# Patient Record
Sex: FEMALE | Race: BLACK OR AFRICAN AMERICAN | NOT HISPANIC OR LATINO | Employment: OTHER | URBAN - METROPOLITAN AREA
[De-identification: names, ages, dates, MRNs, and addresses within clinical notes are randomized per-mention and may not be internally consistent; named-entity substitution may affect disease eponyms.]

---

## 2024-11-25 ENCOUNTER — HOSPITAL ENCOUNTER (EMERGENCY)
Facility: HOSPITAL | Age: 89
Discharge: HOME | End: 2024-11-25
Attending: EMERGENCY MEDICINE
Payer: MEDICARE

## 2024-11-25 VITALS
DIASTOLIC BLOOD PRESSURE: 84 MMHG | SYSTOLIC BLOOD PRESSURE: 150 MMHG | TEMPERATURE: 98 F | HEART RATE: 82 BPM | RESPIRATION RATE: 18 BRPM | HEIGHT: 62 IN | BODY MASS INDEX: 17.66 KG/M2 | WEIGHT: 96 LBS | OXYGEN SATURATION: 98 %

## 2024-11-25 DIAGNOSIS — M25.559 HIP PAIN: ICD-10-CM

## 2024-11-25 DIAGNOSIS — R07.9 CHEST PAIN: ICD-10-CM

## 2024-11-25 LAB
ALBUMIN SERPL BCP-MCNC: 3.5 G/DL (ref 3.5–5.2)
ALP SERPL-CCNC: 65 U/L (ref 55–135)
ALT SERPL W/O P-5'-P-CCNC: 13 U/L (ref 10–44)
ANION GAP SERPL CALC-SCNC: 4 MMOL/L (ref 8–16)
AST SERPL-CCNC: 15 U/L (ref 10–40)
BASOPHILS # BLD AUTO: 0.01 K/UL (ref 0–0.2)
BASOPHILS NFR BLD: 0.2 % (ref 0–1.9)
BILIRUB SERPL-MCNC: 0.4 MG/DL (ref 0.1–1)
BNP SERPL-MCNC: 282 PG/ML (ref 0–99)
BUN SERPL-MCNC: 16 MG/DL (ref 8–23)
CALCIUM SERPL-MCNC: 9.2 MG/DL (ref 8.7–10.5)
CHLORIDE SERPL-SCNC: 106 MMOL/L (ref 95–110)
CO2 SERPL-SCNC: 24 MMOL/L (ref 23–29)
CREAT SERPL-MCNC: 0.6 MG/DL (ref 0.5–1.4)
DIFFERENTIAL METHOD BLD: ABNORMAL
EOSINOPHIL # BLD AUTO: 0.1 K/UL (ref 0–0.5)
EOSINOPHIL NFR BLD: 2.2 % (ref 0–8)
ERYTHROCYTE [DISTWIDTH] IN BLOOD BY AUTOMATED COUNT: 15.9 % (ref 11.5–14.5)
EST. GFR  (NO RACE VARIABLE): >60 ML/MIN/1.73 M^2
GLUCOSE SERPL-MCNC: 125 MG/DL (ref 70–110)
HCT VFR BLD AUTO: 34.9 % (ref 37–48.5)
HGB BLD-MCNC: 10.9 G/DL (ref 12–16)
IMM GRANULOCYTES # BLD AUTO: 0.04 K/UL (ref 0–0.04)
IMM GRANULOCYTES NFR BLD AUTO: 0.6 % (ref 0–0.5)
INR PPP: 1.5 (ref 0.8–1.2)
LYMPHOCYTES # BLD AUTO: 2.4 K/UL (ref 1–4.8)
LYMPHOCYTES NFR BLD: 37.4 % (ref 18–48)
MAGNESIUM SERPL-MCNC: 1.8 MG/DL (ref 1.6–2.6)
MCH RBC QN AUTO: 29.8 PG (ref 27–31)
MCHC RBC AUTO-ENTMCNC: 31.2 G/DL (ref 32–36)
MCV RBC AUTO: 95 FL (ref 82–98)
MONOCYTES # BLD AUTO: 0.6 K/UL (ref 0.3–1)
MONOCYTES NFR BLD: 9.1 % (ref 4–15)
NEUTROPHILS # BLD AUTO: 3.2 K/UL (ref 1.8–7.7)
NEUTROPHILS NFR BLD: 50.5 % (ref 38–73)
NRBC BLD-RTO: 0 /100 WBC
OHS QRS DURATION: 76 MS
OHS QTC CALCULATION: 447 MS
PLATELET # BLD AUTO: 262 K/UL (ref 150–450)
PMV BLD AUTO: 10.3 FL (ref 9.2–12.9)
POTASSIUM SERPL-SCNC: 3.8 MMOL/L (ref 3.5–5.1)
PROT SERPL-MCNC: 7.2 G/DL (ref 6–8.4)
PROTHROMBIN TIME: 16.1 SEC (ref 9–12.5)
RBC # BLD AUTO: 3.66 M/UL (ref 4–5.4)
SODIUM SERPL-SCNC: 134 MMOL/L (ref 136–145)
TROPONIN I SERPL HS-MCNC: 4.6 PG/ML (ref 0–14.9)
TROPONIN I SERPL HS-MCNC: 5.3 PG/ML (ref 0–14.9)
WBC # BLD AUTO: 6.28 K/UL (ref 3.9–12.7)

## 2024-11-25 PROCEDURE — 84484 ASSAY OF TROPONIN QUANT: CPT | Mod: 91

## 2024-11-25 PROCEDURE — 83880 ASSAY OF NATRIURETIC PEPTIDE: CPT

## 2024-11-25 PROCEDURE — 99285 EMERGENCY DEPT VISIT HI MDM: CPT | Mod: 25

## 2024-11-25 PROCEDURE — 85025 COMPLETE CBC W/AUTO DIFF WBC: CPT

## 2024-11-25 PROCEDURE — 85610 PROTHROMBIN TIME: CPT

## 2024-11-25 PROCEDURE — 80053 COMPREHEN METABOLIC PANEL: CPT

## 2024-11-25 PROCEDURE — 83735 ASSAY OF MAGNESIUM: CPT

## 2024-11-26 NOTE — ED PROVIDER NOTES
Encounter Date: 11/25/2024       History     Chief Complaint   Patient presents with    Chest Pain    right leg pain     Patient has neuropathy.  Patient claims midsternal chest pain which radiates to the left side of her chest and left flank.  Hx of pacemaker and A-Fib.  EMS administered 324 aspirin and 1 SL nitro spray.     Patient presents complaining of chest pain and right hip pain.  Per EMS patient complained of pain and she was given nitro and aspirin prior to arrival.  Upon arrival to the ER patient was denying chest pain to me however she voiced chest pain to the nurse.  She was denying hip pain to me although she voiced hip pain to the nurse.  Patient does appear elderly.  She does appear to have multiple medical problems.  She comes from [facility].      Review of patient's allergies indicates:  No Known Allergies  No past medical history on file.  No past surgical history on file.  No family history on file.  Social History     Tobacco Use    Smoking status: Unknown   Substance Use Topics    Alcohol use: Never     Review of Systems   All other systems reviewed and are negative.      Physical Exam     Initial Vitals [11/25/24 1431]   BP Pulse Resp Temp SpO2   (!) 154/95 88 18 98.2 °F (36.8 °C) 99 %      MAP       --         Physical Exam    Nursing note and vitals reviewed.  Constitutional: She appears well-developed and well-nourished.   Pleasant, polite   HENT:   Head: Normocephalic and atraumatic.   Eyes: EOM are normal.   Neck: Neck supple.   Normal range of motion.  Cardiovascular:  Intact distal pulses.           Pulmonary/Chest: Breath sounds normal. No respiratory distress.   Abdominal: Abdomen is soft.   Musculoskeletal:         General: Normal range of motion.      Cervical back: Normal range of motion and neck supple.      Comments: No obvious deformity to the right hip, full range of motion     Neurological: She is alert and oriented to person, place, and time.   Skin: Skin  is warm and dry. Capillary refill takes less than 2 seconds.   Psychiatric: She has a normal mood and affect. Her behavior is normal. Judgment and thought content normal.         ED Course   Procedures  Labs Reviewed   CBC W/ AUTO DIFFERENTIAL - Abnormal       Result Value    WBC 6.28      RBC 3.66 (*)     Hemoglobin 10.9 (*)     Hematocrit 34.9 (*)     MCV 95      MCH 29.8      MCHC 31.2 (*)     RDW 15.9 (*)     Platelets 262      MPV 10.3      Immature Granulocytes 0.6 (*)     Gran # (ANC) 3.2      Immature Grans (Abs) 0.04      Lymph # 2.4      Mono # 0.6      Eos # 0.1      Baso # 0.01      nRBC 0      Gran % 50.5      Lymph % 37.4      Mono % 9.1      Eosinophil % 2.2      Basophil % 0.2      Differential Method Automated     COMPREHENSIVE METABOLIC PANEL - Abnormal    Sodium 134 (*)     Potassium 3.8      Chloride 106      CO2 24      Glucose 125 (*)     BUN 16      Creatinine 0.6      Calcium 9.2      Total Protein 7.2      Albumin 3.5      Total Bilirubin 0.4      Alkaline Phosphatase 65      AST 15      ALT 13      eGFR >60.0      Anion Gap 4 (*)    B-TYPE NATRIURETIC PEPTIDE - Abnormal     (*)    PROTIME-INR - Abnormal    Prothrombin Time 16.1 (*)     INR 1.5 (*)    MAGNESIUM    Magnesium 1.8     TROPONIN I HIGH SENSITIVITY    Troponin I High Sensitivity 4.6     TROPONIN I HIGH SENSITIVITY    Troponin I High Sensitivity 5.3          ECG Results              EKG 12-lead (In process)        Collection Time Result Time QRS Duration OHS QTC Calculation    11/25/24 15:51:41 11/25/24 16:44:24 76 447                     In process by Interface, Lab In [facility] (11/25/24 16:44:28)                   Narrative:    Test Reason : R07.9,    Vent. Rate :  96 BPM     Atrial Rate :    BPM     P-R Int :    ms          QRS Dur :  76 ms      QT Int : 354 ms       P-R-T Axes :     52 -10 degrees    QTcB Int : 447 ms    Atrial fibrillation with premature ventricular or aberrantly conducted  complexes  Nonspecific T wave  abnormality  Abnormal ECG  No previous ECGs available    Referred By: AAAREFERRAL SELF           Confirmed By:                       In process by Interface, Lab In [facility] (11/25/24 16:44:14)                   Narrative:    Test Reason : R07.9,    Vent. Rate :  96 BPM     Atrial Rate :    BPM     P-R Int :    ms          QRS Dur :  76 ms      QT Int : 354 ms       P-R-T Axes :     52 -10 degrees    QTcB Int : 447 ms    Atrial fibrillation with premature ventricular or aberrantly conducted  complexes  Nonspecific T wave abnormality  Abnormal ECG  No previous ECGs available    Referred By: AAAREFERRAL SELF           Confirmed By:                                   Imaging Results              X-Ray Hip 2 or 3 views Right with Pelvis when performed (In process)                      X-Ray Chest AP Portable (Final result)  Result time 11/25/24 19:08:53      Final result by Ruperto Song MD (11/25/24 19:08:53)                   Impression:      As above.      Electronically signed by: Ruperto Song  Date:    11/25/2024  Time:    19:08               Narrative:    EXAMINATION:  XR CHEST AP PORTABLE    CLINICAL HISTORY:  chest pain;    TECHNIQUE:  Single frontal view of the chest was performed.    COMPARISON:  None    FINDINGS:  Mildly enlarged cardiac silhouette.  Minimal bibasilar scarring or atelectasis.  No focal consolidation.  Left chest wall cardiac pacemaker.                                       Medications - No data to display  Medical Decision Making  Patient appears in no distress    Considerations include musculoskeletal pain, acute coronary syndrome, unstable angina, pneumonia, acute kidney injury, dehydration, electrolyte abnormalities    In the ER patient's EKGs irregularly irregular with no ST elevation.  This is consistent with patient's history of atrial fibrillation.  Her high sensitivity troponin is negative x2.  Her chest x-ray shows no acute cardiopulmonary process.  Her right hip x-ray  shows no evidence of acute fracture although there does appear to be an osteophyte to the right greater trochanter.  Her electrolytes are within normal limits.  There is no evidence of renal failure.  At this time patient presenting with multiple complaints with uncertainty on her history.  In the abundance of caution troponins and labs were sent with no evidence of any acute deterioration.  Patient was reassured and advised she could return to the nursing home.  Patient was discharged in stable condition.  Detailed return precautions discussed.    Amount and/or Complexity of Data Reviewed  Labs:  Decision-making details documented in ED Course.  Radiology: ordered.               ED Course as of 11/25/24 1956 Mon Nov 25, 2024   1704 Troponin I High Sensitivity: 4.6 [AP]   1704 Magnesium : 1.8 [AP]   1704 BNP(!): 282 [AP]   1704 Sodium(!): 134 [AP]   1704 Potassium: 3.8 [AP]   1704 Chloride: 106 [AP]   1704 CO2: 24 [AP]   1704 Glucose(!): 125 [AP]   1704 BUN: 16 [AP]   1704 Creatinine: 0.6 [AP]      ED Course User Index  [AP] Akshat Horton MD                           Clinical Impression:  Final diagnoses:  [R07.9] Chest pain  [M25.559] Hip pain          ED Disposition Condition    Discharge Stable          ED Prescriptions    None       Follow-up Information    None          Akshat Horton MD  11/25/24 1956

## 2024-11-26 NOTE — ED NOTES
Pt. Lying in bed, rails up x 2, no complaint at this time, normal mentation, breathing adequately with minimal effort

## 2025-02-27 ENCOUNTER — HOSPITAL ENCOUNTER (EMERGENCY)
Facility: HOSPITAL | Age: OVER 89
Discharge: ANOTHER HOSPITAL | End: 2025-02-28
Attending: EMERGENCY MEDICINE
Payer: MEDICARE

## 2025-02-27 DIAGNOSIS — R07.9 CHEST PAIN: ICD-10-CM

## 2025-02-27 DIAGNOSIS — R79.1 ELEVATED INR: ICD-10-CM

## 2025-02-27 DIAGNOSIS — K83.1 BILIARY OBSTRUCTION: ICD-10-CM

## 2025-02-27 DIAGNOSIS — E80.6 HYPERBILIRUBINEMIA: Primary | ICD-10-CM

## 2025-02-27 DIAGNOSIS — R74.01 ELEVATED TRANSAMINASE LEVEL: ICD-10-CM

## 2025-02-27 LAB
ALBUMIN SERPL BCP-MCNC: 3.3 G/DL (ref 3.5–5.2)
ALLENS TEST: ABNORMAL
ALP SERPL-CCNC: 571 U/L (ref 55–135)
ALT SERPL W/O P-5'-P-CCNC: 71 U/L (ref 10–44)
ANION GAP SERPL CALC-SCNC: 7 MMOL/L (ref 8–16)
ANISOCYTOSIS BLD QL SMEAR: SLIGHT
AST SERPL-CCNC: 97 U/L (ref 10–40)
BASOPHILS # BLD AUTO: 0.02 K/UL (ref 0–0.2)
BASOPHILS NFR BLD: 0.3 % (ref 0–1.9)
BILIRUB SERPL-MCNC: 19.5 MG/DL (ref 0.1–1)
BILIRUB UR QL STRIP: ABNORMAL
BUN SERPL-MCNC: 6 MG/DL (ref 8–23)
CALCIUM SERPL-MCNC: 8.8 MG/DL (ref 8.7–10.5)
CHLORIDE SERPL-SCNC: 104 MMOL/L (ref 95–110)
CLARITY UR: ABNORMAL
CO2 SERPL-SCNC: 28 MMOL/L (ref 23–29)
COLOR UR: YELLOW
CREAT SERPL-MCNC: 0.6 MG/DL (ref 0.5–1.4)
DELSYS: ABNORMAL
DIFFERENTIAL METHOD BLD: ABNORMAL
EOSINOPHIL # BLD AUTO: 0.1 K/UL (ref 0–0.5)
EOSINOPHIL NFR BLD: 1.7 % (ref 0–8)
ERYTHROCYTE [DISTWIDTH] IN BLOOD BY AUTOMATED COUNT: 22.2 % (ref 11.5–14.5)
EST. GFR  (NO RACE VARIABLE): >60 ML/MIN/1.73 M^2
GLUCOSE SERPL-MCNC: 133 MG/DL (ref 70–110)
GLUCOSE UR QL STRIP: NEGATIVE
HCO3 UR-SCNC: 30.8 MMOL/L (ref 24–28)
HCT VFR BLD AUTO: 32.2 % (ref 37–48.5)
HGB BLD-MCNC: 10.8 G/DL (ref 12–16)
HGB UR QL STRIP: ABNORMAL
HYALINE CASTS #/AREA URNS LPF: 1 /LPF
IMM GRANULOCYTES # BLD AUTO: 0.04 K/UL (ref 0–0.04)
IMM GRANULOCYTES NFR BLD AUTO: 0.6 % (ref 0–0.5)
INFLUENZA A, MOLECULAR: NEGATIVE
INFLUENZA B, MOLECULAR: NEGATIVE
INR PPP: 3.3 (ref 0.8–1.2)
KETONES UR QL STRIP: NEGATIVE
LACTATE SERPL-SCNC: 1.2 MMOL/L (ref 0.5–1.9)
LEUKOCYTE ESTERASE UR QL STRIP: ABNORMAL
LIPASE SERPL-CCNC: <3 U/L (ref 4–60)
LYMPHOCYTES # BLD AUTO: 1.4 K/UL (ref 1–4.8)
LYMPHOCYTES NFR BLD: 19.8 % (ref 18–48)
MCH RBC QN AUTO: 28.6 PG (ref 27–31)
MCHC RBC AUTO-ENTMCNC: 33.5 G/DL (ref 32–36)
MCV RBC AUTO: 85 FL (ref 82–98)
MICROSCOPIC COMMENT: ABNORMAL
MODE: ABNORMAL
MONOCYTES # BLD AUTO: 0.7 K/UL (ref 0.3–1)
MONOCYTES NFR BLD: 9.1 % (ref 4–15)
NEUTROPHILS # BLD AUTO: 4.9 K/UL (ref 1.8–7.7)
NEUTROPHILS NFR BLD: 68.5 % (ref 38–73)
NITRITE UR QL STRIP: NEGATIVE
NRBC BLD-RTO: 0 /100 WBC
PCO2 BLDA: 49.7 MMHG (ref 35–45)
PH SMN: 7.4 [PH] (ref 7.35–7.45)
PH UR STRIP: 6 [PH] (ref 5–8)
PLATELET # BLD AUTO: 332 K/UL (ref 150–450)
PLATELET BLD QL SMEAR: ABNORMAL
PMV BLD AUTO: 11.3 FL (ref 9.2–12.9)
PO2 BLDA: 29 MMHG (ref 40–60)
POC BE: 6 MMOL/L
POC SATURATED O2: 54 % (ref 95–100)
POC TCO2: 32 MMOL/L (ref 24–29)
POCT GLUCOSE: 151 MG/DL (ref 70–110)
POIKILOCYTOSIS BLD QL SMEAR: ABNORMAL
POTASSIUM SERPL-SCNC: 3.6 MMOL/L (ref 3.5–5.1)
PROT SERPL-MCNC: 6.8 G/DL (ref 6–8.4)
PROT UR QL STRIP: ABNORMAL
PROTHROMBIN TIME: 34 SEC (ref 9–12.5)
RBC # BLD AUTO: 3.78 M/UL (ref 4–5.4)
RBC #/AREA URNS HPF: 2 /HPF (ref 0–4)
SAMPLE: ABNORMAL
SITE: ABNORMAL
SODIUM SERPL-SCNC: 139 MMOL/L (ref 136–145)
SP GR UR STRIP: 1.02 (ref 1–1.03)
SP02: 100
SPECIMEN SOURCE: NORMAL
SQUAMOUS #/AREA URNS HPF: 15 /HPF
TARGETS BLD QL SMEAR: ABNORMAL
TROPONIN I SERPL HS-MCNC: 12.4 PG/ML (ref 0–14.9)
UNIDENT CRYS URNS QL MICRO: 3
URN SPEC COLLECT METH UR: ABNORMAL
UROBILINOGEN UR STRIP-ACNC: NEGATIVE EU/DL
WBC # BLD AUTO: 7.11 K/UL (ref 3.9–12.7)
WBC #/AREA URNS HPF: 100 /HPF (ref 0–5)

## 2025-02-27 PROCEDURE — 99900035 HC TECH TIME PER 15 MIN (STAT)

## 2025-02-27 PROCEDURE — 87502 INFLUENZA DNA AMP PROBE: CPT

## 2025-02-27 PROCEDURE — 83690 ASSAY OF LIPASE: CPT

## 2025-02-27 PROCEDURE — 80053 COMPREHEN METABOLIC PANEL: CPT

## 2025-02-27 PROCEDURE — 96374 THER/PROPH/DIAG INJ IV PUSH: CPT

## 2025-02-27 PROCEDURE — 85025 COMPLETE CBC W/AUTO DIFF WBC: CPT

## 2025-02-27 PROCEDURE — 99285 EMERGENCY DEPT VISIT HI MDM: CPT | Mod: 25

## 2025-02-27 PROCEDURE — 93010 ELECTROCARDIOGRAM REPORT: CPT | Mod: ,,, | Performed by: GENERAL PRACTICE

## 2025-02-27 PROCEDURE — 25500020 PHARM REV CODE 255: Performed by: EMERGENCY MEDICINE

## 2025-02-27 PROCEDURE — 82962 GLUCOSE BLOOD TEST: CPT

## 2025-02-27 PROCEDURE — 82803 BLOOD GASES ANY COMBINATION: CPT

## 2025-02-27 PROCEDURE — 83605 ASSAY OF LACTIC ACID: CPT

## 2025-02-27 PROCEDURE — 81001 URINALYSIS AUTO W/SCOPE: CPT

## 2025-02-27 PROCEDURE — 84484 ASSAY OF TROPONIN QUANT: CPT

## 2025-02-27 PROCEDURE — 63600175 PHARM REV CODE 636 W HCPCS: Performed by: STUDENT IN AN ORGANIZED HEALTH CARE EDUCATION/TRAINING PROGRAM

## 2025-02-27 PROCEDURE — 85610 PROTHROMBIN TIME: CPT

## 2025-02-27 PROCEDURE — 93005 ELECTROCARDIOGRAM TRACING: CPT | Performed by: GENERAL PRACTICE

## 2025-02-27 PROCEDURE — 94761 N-INVAS EAR/PLS OXIMETRY MLT: CPT | Mod: XB

## 2025-02-27 PROCEDURE — 25000003 PHARM REV CODE 250: Performed by: STUDENT IN AN ORGANIZED HEALTH CARE EDUCATION/TRAINING PROGRAM

## 2025-02-27 RX ORDER — PANTOPRAZOLE SODIUM 40 MG/1
40 TABLET, DELAYED RELEASE ORAL DAILY
COMMUNITY

## 2025-02-27 RX ORDER — PREGABALIN 50 MG/1
50 CAPSULE ORAL 3 TIMES DAILY
COMMUNITY

## 2025-02-27 RX ORDER — SERTRALINE HYDROCHLORIDE 25 MG/1
25 TABLET, FILM COATED ORAL DAILY
COMMUNITY

## 2025-02-27 RX ORDER — IPRATROPIUM BROMIDE AND ALBUTEROL SULFATE 2.5; .5 MG/3ML; MG/3ML
3 SOLUTION RESPIRATORY (INHALATION) EVERY 6 HOURS PRN
COMMUNITY
Start: 2025-02-08

## 2025-02-27 RX ORDER — BENAZEPRIL HYDROCHLORIDE 20 MG/1
20 TABLET ORAL DAILY
COMMUNITY

## 2025-02-27 RX ORDER — RIVAROXABAN 15 MG/1
15 TABLET, FILM COATED ORAL DAILY
COMMUNITY
Start: 2025-02-25

## 2025-02-27 RX ORDER — METOPROLOL SUCCINATE 25 MG/1
25 TABLET, EXTENDED RELEASE ORAL DAILY
COMMUNITY

## 2025-02-27 RX ORDER — ATORVASTATIN CALCIUM 10 MG/1
10 TABLET, FILM COATED ORAL DAILY
COMMUNITY

## 2025-02-27 RX ADMIN — PHYTONADIONE 10 MG: 10 INJECTION, EMULSION INTRAMUSCULAR; INTRAVENOUS; SUBCUTANEOUS at 07:02

## 2025-02-27 RX ADMIN — IOHEXOL 100 ML: 350 INJECTION, SOLUTION INTRAVENOUS at 11:02

## 2025-02-27 NOTE — ED PROVIDER NOTES
Encounter Date: 2/27/2025       History     Chief Complaint   Patient presents with    abnormal labs     Sent from [facility], abnormal labs, yellowing skin     Megan Godoy is a 89 y.o. female with a history of heart failure, diabetes, AFib, dementia, anemia and hypertension presenting to the ED with abnormal labs.  Per EMS nursing home reported yellowing of skin times unknown time.  With elevated GGT.  Abdominal ultrasound was done which found significant gallbladder sludge with dilation of hepatic veins and a normal common bile duct size.  On arrival patient complaining of pain to her right leg, abdomen and chest.  Unable to say when any of this started or give HPI surrounding pain secondary to dementia.     Review of patient's allergies indicates:  No Known Allergies  No past medical history on file.  Past Surgical History:   Procedure Laterality Date    ENDOSCOPIC ULTRASOUND OF UPPER GASTROINTESTINAL TRACT N/A 2/28/2025    Procedure: ULTRASOUND, UPPER GI TRACT, ENDOSCOPIC;  Surgeon: Goyo Mai MD;  Location: [facility];  Service: Endoscopy;  Laterality: N/A;    ERCP N/A 2/28/2025    Procedure: ERCP (ENDOSCOPIC RETROGRADE CHOLANGIOPANCREATOGRAPHY);  Surgeon: Goyo Mai MD;  Location: [facility];  Service: Endoscopy;  Laterality: N/A;     No family history on file.  Social History[1]  Review of Systems   Unable to perform ROS: Dementia       Physical Exam     Initial Vitals [02/27/25 1628]   BP Pulse Resp Temp SpO2   (!) 142/81 79 18 98.5 °F (36.9 °C) 100 %      MAP       --         Physical Exam    Nursing note and vitals reviewed.  Constitutional: She appears well-developed and well-nourished.   HENT:   Head: Normocephalic and atraumatic.   Right Ear: External ear normal.   Left Ear: External ear normal.   Nose: Nose normal. Mouth/Throat: Oropharynx is clear and moist.   Eyes: Conjunctivae and EOM are normal. Scleral icterus is present.   Hazy corneas bilaterally   Neck: Neck supple.   Normal  range of motion.  Cardiovascular:  Normal rate and regular rhythm.           Pulmonary/Chest: No respiratory distress. She has wheezes (End expiratory wheezing). She has no rales.   Abdominal: Abdomen is soft. She exhibits no distension. There is abdominal tenderness (epigastric region (inconsistent during exam)). There is no rebound and no guarding.   Musculoskeletal:         General: Edema (Bilateral lower extremity edema) present.      Cervical back: Normal range of motion and neck supple.     Neurological: She is alert. GCS score is 15.   A&O to herself   Skin: Skin is warm and dry. Capillary refill takes less than 2 seconds.   Well-healed scars over right lower leg and left shoulder.  No new trauma.   Psychiatric: She has a normal mood and affect. Her behavior is normal. Judgment and thought content normal.         ED Course   Procedures  Labs Reviewed   COMPREHENSIVE METABOLIC PANEL - Abnormal       Result Value    Sodium 139      Potassium 3.6      Chloride 104      CO2 28      Glucose 133 (*)     BUN 6 (*)     Creatinine 0.6      Calcium 8.8      Total Protein 6.8      Albumin 3.3 (*)     Total Bilirubin 19.5 (*)     Alkaline Phosphatase 571 (*)     AST 97 (*)     ALT 71 (*)     eGFR >60.0      Anion Gap 7 (*)    CBC W/ AUTO DIFFERENTIAL - Abnormal    WBC 7.11      RBC 3.78 (*)     Hemoglobin 10.8 (*)     Hematocrit 32.2 (*)     MCV 85      MCH 28.6      MCHC 33.5      RDW 22.2 (*)     Platelets 332      MPV 11.3      Immature Granulocytes 0.6 (*)     Gran # (ANC) 4.9      Immature Grans (Abs) 0.04      Lymph # 1.4      Mono # 0.7      Eos # 0.1      Baso # 0.02      nRBC 0      Gran % 68.5      Lymph % 19.8      Mono % 9.1      Eosinophil % 1.7      Basophil % 0.3      Platelet Estimate Appears normal      Aniso Slight      Poik Marked      Target Cells Marked      Differential Method Automated     PROTIME-INR - Abnormal    Prothrombin Time 34.0 (*)     INR 3.3 (*)    LIPASE - Abnormal    Lipase <3 (*)     URINALYSIS - Abnormal    Specimen UA Urine, Clean Catch      Color, UA Yellow      Appearance, UA Hazy (*)     pH, UA 6.0      Specific Gravity, UA 1.020      Protein, UA Trace (*)     Glucose, UA Negative      Ketones, UA Negative      Bilirubin (UA) 3+ (*)     Occult Blood UA TRACE      Nitrite, UA Negative      Urobilinogen, UA Negative      Leukocytes, UA 3+ (*)     Narrative:     Collection Type->Urine, Clean Catch   URINALYSIS MICROSCOPIC - Abnormal    RBC, UA 2      WBC,  (*)     Squam Epithel, UA 15      Hyaline Casts, UA 1      Unclass Sugar UA 3      Microscopic Comment SEE COMMENT      Narrative:     Collection Type->Urine, Clean Catch   POCT GLUCOSE - Abnormal    POCT Glucose 151 (*)    ISTAT PROCEDURE - Abnormal    POC PH 7.401      POC PCO2 49.7 (*)     POC PO2 29 (*)     POC HCO3 30.8 (*)     POC BE 6 (*)     POC SATURATED O2 54      POC TCO2 32 (*)     Sample VENOUS      Site Other      Allens Test N/A      DelSys Room Air      Mode SPONT      Sp02 100     INFLUENZA A AND B ANTIGEN    Influenza A, Molecular Negative      Influenza B, Molecular Negative      Flu A & B Source Nasal swab      Narrative:     Specimen Source->Nasopharyngeal Swab   TROPONIN I HIGH SENSITIVITY    Troponin I High Sensitivity 12.4     LACTIC ACID, PLASMA    Lactate (Lactic Acid) 1.2     AMMONIA        ECG Results              EKG 12-lead (Final result)        Collection Time Result Time QRS Duration OHS QTC Calculation    02/27/25 17:12:11 03/01/25 21:08:23 70 453                     Final result by Interface, Lab In [facility] (03/01/25 21:08:30)                   Narrative:    Test Reason : R07.9,    Vent. Rate :  83 BPM     Atrial Rate :  78 BPM     P-R Int :    ms          QRS Dur :  70 ms      QT Int : 386 ms       P-R-T Axes :     28  -6 degrees    QTcB Int : 453 ms    Atrial fibrillation with a competing junctional pacemaker  Low voltage QRS  Cannot rule out Anterior infarct ,age undetermined  Abnormal ECG  When  compared with ECG of 25-Nov-2024 15:51,  Minimal criteria for Anterior infarct are now Present  Confirmed by Tom Pitt ([id number]) on 3/1/2025 9:08:18 PM    Referred By: AAAREFERRAL SELF           Confirmed By: Tom Pitt                                     EKG 12-lead (Final result)  Result time 03/07/25 11:57:21      Final result by Unknown User (03/07/25 11:57:21)                                      Imaging Results              CT ABDOMEN PELVIS WITH CONTRAST (PANCREAS PROTOCOL) (Final result)  Result time 02/28/25 01:58:41   Procedure changed from CT Abdomen Pelvis W WO Contrast     Final result by Ruperto Song MD (02/28/25 01:58:41)                   Impression:      Distended gallbladder containing stones and sludge. Intrahepatic and extrahepatic biliary ductal dilatation.  Mild pancreatic ductal dilatation. No discrete pancreatic mass.    Recommend MRI/MRCP      Electronically signed by: Ruperto Song  Date:    02/28/2025  Time:    01:58               Narrative:    EXAMINATION:  CT ABDOMEN PELVIS WITH CONTRAST (PANCREAS PROTOCOL)    CLINICAL HISTORY:  Abdominal mass, intra-abdominal neoplasm suspected;pancreatic protocol;    TECHNIQUE:  CT abdomen pelvis with intravenous contrast.  Coronal and sagittal reformatted images were obtained    COMPARISON:  Same day ultrasound    FINDINGS:  Distended gallbladder containing stones and sludge.  Intrahepatic and extrahepatic biliary ductal dilatation.  Mild pancreatic ductal dilatation.  No discrete pancreatic mass.    Unremarkable kidneys.  Bladder wall thickening may relate to cystitis.  No small bowel obstruction.  Unremarkable spleen.  Atherosclerosis of the abdominal aorta and its branches.                                       US Abdomen Limited (Final result)  Result time 02/27/25 19:35:44      Final result by Ruperto Song MD (02/27/25 19:35:44)                   Impression:      Cholelithiasis with probable choledocholithiasis.   Follow-up MRI/MRCP      Electronically signed by: Ruperto Song  Date:    02/27/2025  Time:    19:35               Narrative:    EXAMINATION:  US ABDOMEN LIMITED    CLINICAL HISTORY:  RUQ US, elevated enzymes;    TECHNIQUE:  Limited ultrasound of the right upper quadrant of the abdomen (including pancreas, liver, gallbladder, common bile duct, and spleen) was performed.    COMPARISON:  None.    FINDINGS:  Liver: Normal in size, measuring 14 cm. Homogeneous echotexture. No focal hepatic lesions.    Gallbladder: Cholelithiasis.  Negative sonographic Torres sign    Biliary system: The common duct is dilated, measuring 12 mm.  Mild intrahepatic biliary ductal dilatation.    Miscellaneous: No upper abdominal ascites.  Unremarkable right kidney                                       Medications   iohexoL (OMNIPAQUE 350) injection 100 mL (100 mLs Intravenous Given 2/27/25 1344)     Medical Decision Making  89 y.o. female presenting to the ED for abnormal labs. Vitals on arrival notable for hypertension. Physical exam pertinent for chronically ill, but well-appearing.  Mucous membranes moist.  Scleral icterus bilaterally.  Opaque lenses with normal extraocular movements.  No respiratory distress.  And expiratory wheezing bilaterally.  Abdomen is soft and tender (inconsistently on exam) in different regions.  Pain in right leg with no tenderness on exam.  No overlying skin changes.  Per EMS nursing home reported she often complains of pain all over.  Given GGT elevation is neither sensitive or specific and patient is overtly dimension on exam large workup initiated.  Concern for biliary pathology giving scleral icterus, GGT elevation and outside ultrasound results.    Amount and/or Complexity of Data Reviewed  External Data Reviewed: labs, radiology and notes.     Details: Compared per ED course  Radiology from 10/24/24-10/25/24  CT abdomen and pelvis gallbladder distention  Right upper quadrant ultrasound with no  gallstone or sludge but no dilation  Normal LVEF, mild mitral regurg, severe tricuspid regurgitation  From NH:   Benazepril 20, Dulcolax 10, DuoNebs b.i.d., metoprolol 25 extended   Ggt 386 (nl 0-65)  Transferrin 153 (214-370)  TIBC 214 (250-447)  Labs: ordered. Decision-making details documented in ED Course.  Radiology: ordered and independent interpretation performed. Decision-making details documented in ED Course.  ECG/medicine tests:  Decision-making details documented in ED Course.  Discussion of management or test interpretation with external provider(s): Discussed HPI, exam and workup findings with gastroenterology here, gastroenterology and hospitalist at outside hospital.     Risk  OTC drugs.  Prescription drug management.  Drug therapy requiring intensive monitoring for toxicity.  Decision regarding hospitalization.  Risk Details: Spoke with the granddaughter on the phone who was able to verbalize understanding of patient's condition and need for transfer.  States her mother, patient's daughter, just passed away yesterday.  They will try and make it down to the hospital tomorrow.  Patient remained stable in the emergency department and was transferred to outside hospital.              Attending Attestation:   Physician Attestation Statement for Resident:  As the supervising MD   Physician Attestation Statement: I have personally seen and examined this patient.   I agree with the above history.  -:   As the supervising MD I agree with the above PE.     As the supervising MD I agree with the above treatment, course, plan, and disposition.    I have reviewed and agree with the residents interpretation of the following: lab data and x-rays.                 ED Course as of 03/10/25 1921   Thu Feb 27, 2025   1721 EKG 12-lead  Atrial fibrillation at a rate of 83 with normal axis and normal QRS and QTC intervals.  No ST elevation or depression.  Largely unchanged from prior and November of last year. [SM]   1851  WBC: 7.11 [SM]   1851 Hemoglobin(!): 10.8  Similar to several months ago, do not believe this is an acute process.  Hemodynamically stable and no signs of bleeding. [SM]   1851 Platelet Count: 332 [SM]   1851 Troponin I High Sensitivity: 12.4 [SM]   1851 Influenza A, Molecular: Negative [SM]   1851 Influenza B, Molecular: Negative [SM]   1851 ISTAT PROCEDURE(!)  7.4/50/31 [SM]   1852 INR(!): 3.3  Patient not on blood thinners and given concern for biliary process concern for liver dysfunction. [SM]   1852 US Abdomen Limited  CBD 12mm.  Extensive biliary sludge.  Intrahepatic ductal dilation. Gallbladder wall 2.1mm [SM]   1859 AST(!): 97 [SM]   1859 ALT(!): 71 [SM]   1859 ALP(!): 571 [SM]   1859 BILIRUBIN TOTAL(!): 19.5  New elevations consistent with concern for biliary pathology. [SM]   1859 Lipase(!): <3 [SM]   1943 RBC, UA: 2 [SM]   1943 WBC, UA(!): 100 [SM]   1943 Squam Epithel, UA: 15 [SM]   1943 Specimen UA: Urine, Clean Catch [SM]   1943 Appearance, UA(!): Hazy [SM]   1943 Protein, UA(!): Trace [SM]   1943 Bilirubin (UA)(!): 3+ [SM]   1943 Leukocyte Esterase, UA(!): 3+ [SM]   2009 Lactic Acid Level: 1.2 [SM]      ED Course User Index  [SM] Kriss Gonsalez MD                           Clinical Impression:  Final diagnoses:  [R07.9] Chest pain  [E80.6] Hyperbilirubinemia (Primary)  [K83.1] Biliary obstruction  [R74.01] Elevated transaminase level  [R79.1] Elevated INR          ED Disposition Condition    Transfer to Another Facility Stable                  Kriss Gonsalez MD  Resident  02/28/25 0046       Kriss Gonsalez MD  Resident  02/28/25 0046         [1]   Social History  Tobacco Use    Smoking status: Unknown   Substance Use Topics    Alcohol use: Never        Scar Gómez MD  03/10/25 1921

## 2025-02-28 ENCOUNTER — ANESTHESIA EVENT (OUTPATIENT)
Dept: ENDOSCOPY | Facility: HOSPITAL | Age: OVER 89
DRG: 445 | End: 2025-02-28
Payer: MEDICARE

## 2025-02-28 ENCOUNTER — ANESTHESIA (OUTPATIENT)
Dept: ENDOSCOPY | Facility: HOSPITAL | Age: OVER 89
DRG: 445 | End: 2025-02-28
Payer: MEDICARE

## 2025-02-28 ENCOUNTER — HOSPITAL ENCOUNTER (INPATIENT)
Facility: HOSPITAL | Age: OVER 89
LOS: 3 days | Discharge: HOME | DRG: 445 | End: 2025-03-03
Attending: HOSPITALIST | Admitting: HOSPITALIST
Payer: MEDICARE

## 2025-02-28 VITALS
OXYGEN SATURATION: 96 % | WEIGHT: 100 LBS | HEART RATE: 83 BPM | RESPIRATION RATE: 24 BRPM | DIASTOLIC BLOOD PRESSURE: 70 MMHG | BODY MASS INDEX: 18.4 KG/M2 | TEMPERATURE: 99 F | SYSTOLIC BLOOD PRESSURE: 146 MMHG | HEIGHT: 62 IN

## 2025-02-28 DIAGNOSIS — Z79.4 TYPE 2 DIABETES MELLITUS WITHOUT COMPLICATION, WITH LONG-TERM CURRENT USE OF INSULIN: ICD-10-CM

## 2025-02-28 DIAGNOSIS — R07.9 CHEST PAIN: ICD-10-CM

## 2025-02-28 DIAGNOSIS — I50.32 CHRONIC HEART FAILURE WITH PRESERVED EJECTION FRACTION: ICD-10-CM

## 2025-02-28 DIAGNOSIS — E11.9 TYPE 2 DIABETES MELLITUS WITHOUT COMPLICATION, WITH LONG-TERM CURRENT USE OF INSULIN: ICD-10-CM

## 2025-02-28 DIAGNOSIS — K83.1 BILIARY OBSTRUCTION: Primary | ICD-10-CM

## 2025-02-28 DIAGNOSIS — K86.89 PANCREATIC MASS: ICD-10-CM

## 2025-02-28 PROBLEM — I48.91 A-FIB: Status: ACTIVE | Noted: 2025-02-28

## 2025-02-28 PROBLEM — F03.90 DEMENTIA: Status: ACTIVE | Noted: 2025-02-28

## 2025-02-28 PROBLEM — I10 HTN (HYPERTENSION): Status: ACTIVE | Noted: 2025-02-28

## 2025-02-28 PROBLEM — I50.30 (HFPEF) HEART FAILURE WITH PRESERVED EJECTION FRACTION: Status: ACTIVE | Noted: 2025-02-28

## 2025-02-28 LAB
ANION GAP SERPL CALC-SCNC: 11 MMOL/L (ref 8–16)
BASOPHILS # BLD AUTO: 0.02 K/UL (ref 0–0.2)
BASOPHILS NFR BLD: 0.2 % (ref 0–1.9)
BUN SERPL-MCNC: 2 MG/DL (ref 8–23)
CALCIUM SERPL-MCNC: 9 MG/DL (ref 8.7–10.5)
CHLORIDE SERPL-SCNC: 105 MMOL/L (ref 95–110)
CO2 SERPL-SCNC: 23 MMOL/L (ref 23–29)
CREAT SERPL-MCNC: 0.6 MG/DL (ref 0.5–1.4)
DIFFERENTIAL METHOD BLD: ABNORMAL
EOSINOPHIL # BLD AUTO: 0.1 K/UL (ref 0–0.5)
EOSINOPHIL NFR BLD: 1.1 % (ref 0–8)
ERYTHROCYTE [DISTWIDTH] IN BLOOD BY AUTOMATED COUNT: 22.2 % (ref 11.5–14.5)
EST. GFR  (NO RACE VARIABLE): >60 ML/MIN/1.73 M^2
ESTIMATED AVG GLUCOSE: 88 MG/DL (ref 68–131)
GLUCOSE SERPL-MCNC: 103 MG/DL (ref 70–110)
HBA1C MFR BLD: 4.7 % (ref 4–5.6)
HCT VFR BLD AUTO: 31.1 % (ref 37–48.5)
HGB BLD-MCNC: 10.8 G/DL (ref 12–16)
IMM GRANULOCYTES # BLD AUTO: 0.04 K/UL (ref 0–0.04)
IMM GRANULOCYTES NFR BLD AUTO: 0.5 % (ref 0–0.5)
INR PPP: 1.5 (ref 0.8–1.2)
LYMPHOCYTES # BLD AUTO: 1.7 K/UL (ref 1–4.8)
LYMPHOCYTES NFR BLD: 19.6 % (ref 18–48)
MCH RBC QN AUTO: 29.3 PG (ref 27–31)
MCHC RBC AUTO-ENTMCNC: 34.7 G/DL (ref 32–36)
MCV RBC AUTO: 84 FL (ref 82–98)
MONOCYTES # BLD AUTO: 0.8 K/UL (ref 0.3–1)
MONOCYTES NFR BLD: 9.5 % (ref 4–15)
NEUTROPHILS # BLD AUTO: 5.8 K/UL (ref 1.8–7.7)
NEUTROPHILS NFR BLD: 69.1 % (ref 38–73)
NRBC BLD-RTO: 0 /100 WBC
PLATELET # BLD AUTO: 316 K/UL (ref 150–450)
PMV BLD AUTO: 11.4 FL (ref 9.2–12.9)
POCT GLUCOSE: 102 MG/DL (ref 70–110)
POTASSIUM SERPL-SCNC: 3.2 MMOL/L (ref 3.5–5.1)
PROTHROMBIN TIME: 16.5 SEC (ref 9–12.5)
RBC # BLD AUTO: 3.69 M/UL (ref 4–5.4)
SODIUM SERPL-SCNC: 139 MMOL/L (ref 136–145)
WBC # BLD AUTO: 8.4 K/UL (ref 3.9–12.7)

## 2025-02-28 PROCEDURE — 25000003 PHARM REV CODE 250: Performed by: INTERNAL MEDICINE

## 2025-02-28 PROCEDURE — 88342 IMHCHEM/IMCYTCHM 1ST ANTB: CPT | Performed by: PATHOLOGY

## 2025-02-28 PROCEDURE — C1876 STENT, NON-COA/NON-COV W/DEL: HCPCS | Performed by: INTERNAL MEDICINE

## 2025-02-28 PROCEDURE — 85610 PROTHROMBIN TIME: CPT | Performed by: HOSPITALIST

## 2025-02-28 PROCEDURE — 43274 ERCP DUCT STENT PLACEMENT: CPT | Performed by: INTERNAL MEDICINE

## 2025-02-28 PROCEDURE — 27202131 HC NEEDLE, FNB SINGLE (ANY): Performed by: INTERNAL MEDICINE

## 2025-02-28 PROCEDURE — 43242 EGD US FINE NEEDLE BX/ASPIR: CPT | Performed by: INTERNAL MEDICINE

## 2025-02-28 PROCEDURE — 11000001 HC ACUTE MED/SURG PRIVATE ROOM

## 2025-02-28 PROCEDURE — 99223 1ST HOSP IP/OBS HIGH 75: CPT | Mod: 25,,, | Performed by: INTERNAL MEDICINE

## 2025-02-28 PROCEDURE — 88341 IMHCHEM/IMCYTCHM EA ADD ANTB: CPT | Mod: 26,,, | Performed by: PATHOLOGY

## 2025-02-28 PROCEDURE — 88305 TISSUE EXAM BY PATHOLOGIST: CPT | Performed by: PATHOLOGY

## 2025-02-28 PROCEDURE — 36415 COLL VENOUS BLD VENIPUNCTURE: CPT | Mod: XB

## 2025-02-28 PROCEDURE — 43274 ERCP DUCT STENT PLACEMENT: CPT | Mod: ,,, | Performed by: INTERNAL MEDICINE

## 2025-02-28 PROCEDURE — BF111ZZ FLUOROSCOPY OF BILIARY AND PANCREATIC DUCTS USING LOW OSMOLAR CONTRAST: ICD-10-PCS | Performed by: INTERNAL MEDICINE

## 2025-02-28 PROCEDURE — 63600175 PHARM REV CODE 636 W HCPCS: Performed by: NURSE ANESTHETIST, CERTIFIED REGISTERED

## 2025-02-28 PROCEDURE — 88172 CYTP DX EVAL FNA 1ST EA SITE: CPT | Mod: 26,,, | Performed by: PATHOLOGY

## 2025-02-28 PROCEDURE — 74328 X-RAY BILE DUCT ENDOSCOPY: CPT | Mod: 26,,, | Performed by: INTERNAL MEDICINE

## 2025-02-28 PROCEDURE — 74328 X-RAY BILE DUCT ENDOSCOPY: CPT | Mod: TC | Performed by: INTERNAL MEDICINE

## 2025-02-28 PROCEDURE — 88342 IMHCHEM/IMCYTCHM 1ST ANTB: CPT | Mod: 26,,, | Performed by: PATHOLOGY

## 2025-02-28 PROCEDURE — 0F798DZ DILATION OF COMMON BILE DUCT WITH INTRALUMINAL DEVICE, VIA NATURAL OR ARTIFICIAL OPENING ENDOSCOPIC: ICD-10-PCS | Performed by: INTERNAL MEDICINE

## 2025-02-28 PROCEDURE — 94761 N-INVAS EAR/PLS OXIMETRY MLT: CPT

## 2025-02-28 PROCEDURE — 27201674 HC SPHINCTERTOME: Performed by: INTERNAL MEDICINE

## 2025-02-28 PROCEDURE — 37000008 HC ANESTHESIA 1ST 15 MINUTES: Performed by: INTERNAL MEDICINE

## 2025-02-28 PROCEDURE — 80048 BASIC METABOLIC PNL TOTAL CA: CPT | Performed by: FAMILY MEDICINE

## 2025-02-28 PROCEDURE — 88177 CYTP FNA EVAL EA ADDL: CPT | Mod: 26,,, | Performed by: PATHOLOGY

## 2025-02-28 PROCEDURE — 36415 COLL VENOUS BLD VENIPUNCTURE: CPT | Performed by: FAMILY MEDICINE

## 2025-02-28 PROCEDURE — 88172 CYTP DX EVAL FNA 1ST EA SITE: CPT | Performed by: PATHOLOGY

## 2025-02-28 PROCEDURE — 25500020 PHARM REV CODE 255: Performed by: INTERNAL MEDICINE

## 2025-02-28 PROCEDURE — 83036 HEMOGLOBIN GLYCOSYLATED A1C: CPT

## 2025-02-28 PROCEDURE — 0FDG8ZX EXTRACTION OF PANCREAS, VIA NATURAL OR ARTIFICIAL OPENING ENDOSCOPIC, DIAGNOSTIC: ICD-10-PCS | Performed by: INTERNAL MEDICINE

## 2025-02-28 PROCEDURE — 88173 CYTOPATH EVAL FNA REPORT: CPT | Mod: 26,,, | Performed by: PATHOLOGY

## 2025-02-28 PROCEDURE — 36415 COLL VENOUS BLD VENIPUNCTURE: CPT | Performed by: HOSPITALIST

## 2025-02-28 PROCEDURE — 25000003 PHARM REV CODE 250: Performed by: FAMILY MEDICINE

## 2025-02-28 PROCEDURE — 63600175 PHARM REV CODE 636 W HCPCS: Mod: JZ,TB

## 2025-02-28 PROCEDURE — 88173 CYTOPATH EVAL FNA REPORT: CPT | Performed by: PATHOLOGY

## 2025-02-28 PROCEDURE — 37000009 HC ANESTHESIA EA ADD 15 MINS: Performed by: INTERNAL MEDICINE

## 2025-02-28 PROCEDURE — 88305 TISSUE EXAM BY PATHOLOGIST: CPT | Mod: 26,,, | Performed by: PATHOLOGY

## 2025-02-28 PROCEDURE — 85025 COMPLETE CBC W/AUTO DIFF WBC: CPT | Performed by: FAMILY MEDICINE

## 2025-02-28 PROCEDURE — C1769 GUIDE WIRE: HCPCS | Performed by: INTERNAL MEDICINE

## 2025-02-28 PROCEDURE — 88341 IMHCHEM/IMCYTCHM EA ADD ANTB: CPT | Performed by: PATHOLOGY

## 2025-02-28 PROCEDURE — 88177 CYTP FNA EVAL EA ADDL: CPT | Performed by: PATHOLOGY

## 2025-02-28 PROCEDURE — 43242 EGD US FINE NEEDLE BX/ASPIR: CPT | Mod: ,,, | Performed by: INTERNAL MEDICINE

## 2025-02-28 PROCEDURE — 25000003 PHARM REV CODE 250: Performed by: NURSE ANESTHETIST, CERTIFIED REGISTERED

## 2025-02-28 RX ORDER — IBUPROFEN 200 MG
16 TABLET ORAL
Status: DISCONTINUED | OUTPATIENT
Start: 2025-02-28 | End: 2025-03-03 | Stop reason: HOSPADM

## 2025-02-28 RX ORDER — IPRATROPIUM BROMIDE AND ALBUTEROL SULFATE 2.5; .5 MG/3ML; MG/3ML
3 SOLUTION RESPIRATORY (INHALATION) EVERY 6 HOURS PRN
Status: DISCONTINUED | OUTPATIENT
Start: 2025-02-28 | End: 2025-03-03 | Stop reason: HOSPADM

## 2025-02-28 RX ORDER — LIDOCAINE HYDROCHLORIDE 20 MG/ML
INJECTION INTRAVENOUS
Status: DISCONTINUED | OUTPATIENT
Start: 2025-02-28 | End: 2025-02-28

## 2025-02-28 RX ORDER — PROPOFOL 10 MG/ML
VIAL (ML) INTRAVENOUS
Status: DISCONTINUED | OUTPATIENT
Start: 2025-02-28 | End: 2025-02-28

## 2025-02-28 RX ORDER — NALOXONE HCL 0.4 MG/ML
0.02 VIAL (ML) INJECTION
Status: DISCONTINUED | OUTPATIENT
Start: 2025-02-28 | End: 2025-03-03 | Stop reason: HOSPADM

## 2025-02-28 RX ORDER — ATORVASTATIN CALCIUM 10 MG/1
10 TABLET, FILM COATED ORAL DAILY
Status: DISCONTINUED | OUTPATIENT
Start: 2025-02-28 | End: 2025-03-03 | Stop reason: HOSPADM

## 2025-02-28 RX ORDER — SODIUM CHLORIDE 0.9 % (FLUSH) 0.9 %
10 SYRINGE (ML) INJECTION EVERY 12 HOURS PRN
Status: DISCONTINUED | OUTPATIENT
Start: 2025-02-28 | End: 2025-03-03 | Stop reason: HOSPADM

## 2025-02-28 RX ORDER — METOPROLOL SUCCINATE 25 MG/1
25 TABLET, EXTENDED RELEASE ORAL DAILY
Status: DISCONTINUED | OUTPATIENT
Start: 2025-02-28 | End: 2025-03-03 | Stop reason: HOSPADM

## 2025-02-28 RX ORDER — IBUPROFEN 200 MG
24 TABLET ORAL
Status: DISCONTINUED | OUTPATIENT
Start: 2025-02-28 | End: 2025-03-03 | Stop reason: HOSPADM

## 2025-02-28 RX ORDER — PHENYLEPHRINE HYDROCHLORIDE 10 MG/ML
INJECTION INTRAVENOUS
Status: DISCONTINUED | OUTPATIENT
Start: 2025-02-28 | End: 2025-02-28

## 2025-02-28 RX ORDER — GLUCAGON 1 MG
1 KIT INJECTION
Status: DISCONTINUED | OUTPATIENT
Start: 2025-02-28 | End: 2025-03-03 | Stop reason: HOSPADM

## 2025-02-28 RX ORDER — LISINOPRIL 20 MG/1
20 TABLET ORAL DAILY
Status: DISCONTINUED | OUTPATIENT
Start: 2025-02-28 | End: 2025-03-03 | Stop reason: HOSPADM

## 2025-02-28 RX ORDER — PREGABALIN 50 MG/1
50 CAPSULE ORAL 3 TIMES DAILY
Status: DISCONTINUED | OUTPATIENT
Start: 2025-02-28 | End: 2025-03-03 | Stop reason: HOSPADM

## 2025-02-28 RX ORDER — KETOROLAC TROMETHAMINE 30 MG/ML
15 INJECTION, SOLUTION INTRAMUSCULAR; INTRAVENOUS EVERY 6 HOURS PRN
Status: DISCONTINUED | OUTPATIENT
Start: 2025-02-28 | End: 2025-03-01

## 2025-02-28 RX ORDER — INSULIN ASPART 100 [IU]/ML
0-5 INJECTION, SOLUTION INTRAVENOUS; SUBCUTANEOUS
Status: DISCONTINUED | OUTPATIENT
Start: 2025-02-28 | End: 2025-03-03 | Stop reason: HOSPADM

## 2025-02-28 RX ORDER — PROPOFOL 10 MG/ML
VIAL (ML) INTRAVENOUS CONTINUOUS PRN
Status: DISCONTINUED | OUTPATIENT
Start: 2025-02-28 | End: 2025-02-28

## 2025-02-28 RX ORDER — PANTOPRAZOLE SODIUM 40 MG/1
40 TABLET, DELAYED RELEASE ORAL DAILY
Status: DISCONTINUED | OUTPATIENT
Start: 2025-02-28 | End: 2025-03-03 | Stop reason: HOSPADM

## 2025-02-28 RX ORDER — INDOMETHACIN 50 MG/1
SUPPOSITORY RECTAL
Status: COMPLETED | OUTPATIENT
Start: 2025-02-28 | End: 2025-02-28

## 2025-02-28 RX ORDER — ACETAMINOPHEN 325 MG/1
650 TABLET ORAL EVERY 6 HOURS PRN
Status: DISCONTINUED | OUTPATIENT
Start: 2025-02-28 | End: 2025-03-03 | Stop reason: HOSPADM

## 2025-02-28 RX ORDER — ONDANSETRON HYDROCHLORIDE 2 MG/ML
4 INJECTION, SOLUTION INTRAVENOUS EVERY 8 HOURS PRN
Status: DISCONTINUED | OUTPATIENT
Start: 2025-02-28 | End: 2025-03-03 | Stop reason: HOSPADM

## 2025-02-28 RX ORDER — SERTRALINE HYDROCHLORIDE 25 MG/1
25 TABLET, FILM COATED ORAL DAILY
Status: DISCONTINUED | OUTPATIENT
Start: 2025-02-28 | End: 2025-03-03 | Stop reason: HOSPADM

## 2025-02-28 RX ADMIN — PREGABALIN 50 MG: 50 CAPSULE ORAL at 03:02

## 2025-02-28 RX ADMIN — GLYCOPYRROLATE 0.2 MG: 0.2 INJECTION, SOLUTION INTRAMUSCULAR; INTRAVITREAL at 01:02

## 2025-02-28 RX ADMIN — PHENYLEPHRINE HYDROCHLORIDE 100 MCG: 10 INJECTION INTRAVENOUS at 01:02

## 2025-02-28 RX ADMIN — PREGABALIN 50 MG: 50 CAPSULE ORAL at 08:02

## 2025-02-28 RX ADMIN — PHENYLEPHRINE HYDROCHLORIDE 50 MCG: 10 INJECTION INTRAVENOUS at 01:02

## 2025-02-28 RX ADMIN — PROPOFOL 125 MCG/KG/MIN: 10 INJECTION, EMULSION INTRAVENOUS at 01:02

## 2025-02-28 RX ADMIN — KETOROLAC TROMETHAMINE 15 MG: 30 INJECTION, SOLUTION INTRAMUSCULAR; INTRAVENOUS at 05:02

## 2025-02-28 RX ADMIN — SODIUM CHLORIDE: 0.9 INJECTION, SOLUTION INTRAVENOUS at 12:02

## 2025-02-28 RX ADMIN — METOPROLOL SUCCINATE 25 MG: 25 TABLET, EXTENDED RELEASE ORAL at 08:02

## 2025-02-28 RX ADMIN — LIDOCAINE HYDROCHLORIDE 100 MG: 20 INJECTION, SOLUTION INTRAVENOUS at 01:02

## 2025-02-28 RX ADMIN — PROPOFOL 50 MG: 10 INJECTION, EMULSION INTRAVENOUS at 01:02

## 2025-02-28 RX ADMIN — LISINOPRIL 20 MG: 20 TABLET ORAL at 08:02

## 2025-02-28 NOTE — HPI
89 y.o. female with past medical history of CHF, AFib, dementia, anemia and hypertension. Patient presents to [facility] -ED with abnormal labs. Per EMS, nursing home reported patient having yellowing of skin. In ED patient had CT scan of abdomen that showed distended gallbladder containing stones and sludge, intra and extrahepatic biliary dilation and mild pancreatic ductal dilation. Patient transferred to [facility] for higher level of care. Patient is a poor historian and most of the history is obtained from previous medical records.     Today reports ongoing abd pain.

## 2025-02-28 NOTE — ASSESSMENT & PLAN NOTE
Patient has paroxysmal (<7 days) atrial fibrillation. Patient is currently in sinus rhythm. [id number] Score: 4. The patients heart rate in the last 24 hours is as follows:  Pulse  Min: 75  Max: 96     Antiarrhythmics  metoprolol succinate (TOPROL-XL) 24 hr tablet 25 mg, Daily, Oral    Anticoagulants       Plan  - Replete lytes with a goal of K>4, Mg >2  - Patient is anticoagulated, see medications listed above.  - Patient's afib is currently controlled  - restart anticoagulation in a.m.

## 2025-02-28 NOTE — H&P
[facility] Medicine  History & Physical    Patient Name: Megan Godoy  MRN: [id number]  Patient Class: IP- Inpatient  Admission Date: 2/28/2025  Attending Physician: Saeid Montano,*   Primary Care Provider: Yamileth, Primary Doctor         Patient information was obtained from patient, past medical records, and ER records.     Subjective:     Principal Problem:Biliary obstruction    Chief Complaint:   Chief Complaint   Patient presents with    Abdominal Pain    abnormal labs        HPI: Megan Godoy is a 89 y.o. female with past medical history of CHF, AFib, dementia, anemia and hypertension.  Patient presents to [facility] with abnormal labs.  Per EMS, nursing home reported patient having yellowing of skin.  In ED patient had CT scan of abdomen that showed distended gallbladder containing stones and sludge, intra and extrahepatic biliary dilation and mild pancreatic ductal dilation.  Patient transferred to [facility] for higher level of care.  Patient is a poor historian and most of the history is obtained from previous medical records.    No past medical history on file.    No past surgical history on file.    Review of patient's allergies indicates:  No Known Allergies    Current Facility-Administered Medications on File Prior to Encounter   Medication    [COMPLETED] iohexoL (OMNIPAQUE 350) injection 100 mL    [DISCONTINUED] phytonadione vitamin k (AQUA-MEPHYTON) 10 mg in D5W 50 mL IVPB     Current Outpatient Medications on File Prior to Encounter   Medication Sig    albuterol-ipratropium (DUO-NEB) 2.5 mg-0.5 mg/3 mL nebulizer solution Take 3 mLs by nebulization every 6 (six) hours as needed for Shortness of Breath.    atorvastatin (LIPITOR) 10 MG tablet Take 10 mg by mouth once daily.    benazepriL (LOTENSIN) 20 MG tablet Take 20 mg by mouth once daily.    metoprolol succinate (TOPROL-XL) 25 MG 24 hr tablet Take 25 mg by mouth once daily.    pantoprazole  (PROTONIX) 40 MG tablet Take 40 mg by mouth once daily.    pregabalin (LYRICA) 50 MG capsule Take 50 mg by mouth 3 (three) times daily.    sertraline (ZOLOFT) 25 MG tablet Take 25 mg by mouth once daily.    XARELTO 15 mg Tab Take 15 mg by mouth once daily.     Family History    None       Tobacco Use    Smoking status: Unknown    Smokeless tobacco: Not on file   Substance and Sexual Activity    Alcohol use: Never    Drug use: Not on file    Sexual activity: Not on file     Review of Systems   Unable to perform ROS: Dementia     Objective:     Vital Signs (Most Recent):  Temp: 99 °F (37.2 °C) (02/28/25 0309)  Pulse: 75 (02/28/25 0309)  Resp: 20 (02/28/25 0309)  BP: (!) 143/75 (02/28/25 0309)  SpO2: 95 % (02/28/25 0309) Vital Signs (24h Range):  Temp:  [98.5 °F (36.9 °C)-99 °F (37.2 °C)] 99 °F (37.2 °C)  Pulse:  [75-96] 75  Resp:  [18-29] 20  SpO2:  [95 %-100 %] 95 %  BP: (134-160)/(68-84) 143/75     Weight: 50 kg (110 lb 3.7 oz)  Body mass index is 20.16 kg/m².     Physical Exam  HENT:      Head: Normocephalic and atraumatic.      Nose: Nose normal.      Mouth/Throat:      Mouth: Mucous membranes are moist.   Eyes:      Pupils: Pupils are equal, round, and reactive to light.   Cardiovascular:      Rate and Rhythm: Regular rhythm.   Pulmonary:      Breath sounds: Normal breath sounds.   Abdominal:      Tenderness: There is abdominal tenderness (epigastric).   Musculoskeletal:         General: Normal range of motion.      Cervical back: Neck supple.   Skin:     General: Skin is warm.   Neurological:      Mental Status: She is alert. She is disoriented.   Psychiatric:         Mood and Affect: Mood normal.              CRANIAL NERVES     CN III, IV, VI   Pupils are equal, round, and reactive to light.       Significant Labs: All pertinent labs within the past 24 hours have been reviewed.  Recent Lab Results  (Last 5 results in the past 24 hours)        02/27/25  1922   02/27/25  1915 02/27/25  1758   02/27/25  1744    02/27/25  1742        Influenza A, Molecular       Negative         Influenza B, Molecular       Negative         Albumin         3.3       ALP         571       Allens Test     N/A           ALT         71       Anion Gap         7       Aniso         Slight       Appearance, UA   Hazy             AST         97       Baso #         0.02       Basophil %         0.3       Bilirubin (UA)   3+  Comment: Positive urine bilirubin is not confirmed. Correlate with   serum bilirubin and clinical presentation.               BILIRUBIN TOTAL         19.5  Comment: For infants and newborns, interpretation of results should be based  on gestational age, weight and in agreement with clinical  observations.    Premature Infant recommended reference ranges:  Up to 24 hours.............<8.0 mg/dL  Up to 48 hours............<12.0 mg/dL  3-5 days..................<15.0 mg/dL  6-29 days.................<15.0 mg/dL         Site     Other           BUN         6       Calcium         8.8       Chloride         104       CO2         28       Color, UA   Yellow             Creatinine         0.6       DelSys     Room Air           Differential Method         Automated       eGFR         >60.0       Eos #         0.1       Eos %         1.7       Flu A & B Source       Nasal swab         Glucose         133       Glucose, UA   Negative             Gran # (ANC)         4.9       Gran %         68.5       Hematocrit         32.2       Hemoglobin         10.8       Hyaline Casts, UA   1             Immature Grans (Abs)         0.04  Comment: Mild elevation in immature granulocytes is non specific and   can be seen in a variety of conditions including stress response,   acute inflammation, trauma and pregnancy. Correlation with other   laboratory and clinical findings is essential.         Immature Granulocytes         0.6       INR         3.3  Comment: Coumadin Therapy:  2.0 - 3.0 for INR for all indicators except mechanical heart  valves  and antiphospholipid syndromes which should use 2.5 - 3.5.         Ketones, UA   Negative             Lactic Acid Level 1.2  Comment: Falsely low lactic acid results can be found in samples   containing >=13.0 mg/dL total bilirubin and/or >=3.5 mg/dL   direct bilirubin.                 Leukocyte Esterase, UA   3+             Lipase         <3       Lymph #         1.4       Lymph %         19.8       MCH         28.6       MCHC         33.5       MCV         85       Microscopic Comment   SEE COMMENT  Comment: Other formed elements not mentioned in the report are not   present in the microscopic examination.                Mode     SPONT           Mono #         0.7       Mono %         9.1       MPV         11.3       NITRITE UA   Negative             nRBC         0       Blood, UA   TRACE             pH, UA   6.0             Platelet Estimate         Appears normal       Platelet Count         332       POC BE     6           POC HCO3     30.8           POC PCO2     49.7           POC PH     7.401           POC PO2     29           POC SATURATED O2     54           POC TCO2     32           Poikilocytosis         Marked       Potassium         3.6       PROTEIN TOTAL         6.8       Protein, UA   Trace  Comment: Recommend a 24 hour urine protein or a urine   protein/creatinine ratio if globulin induced proteinuria is  clinically suspected.               PT         34.0       RBC         3.78       RBC, UA   2             RDW         22.2       Sample     VENOUS           Sodium         139       Sp02     100           Spec Grav UA   1.020             Specimen UA   Urine, Clean Catch             Squam Epithel, UA   15             Target Cells         Marked       Troponin I High Sensitivity         12.4  Comment: Troponin results differ between methods. Do not use   results between Troponin methods interchangeably.    Alkaline Phospatase levels above 400 U/L may   cause false positive results.    Access  hsTnI should not be used for patients taking   Asfotase kamila (Strensiq).         Unclassified Crystals   3             UROBILINOGEN UA   Negative             WBC, UA   100             WBC         7.11                              Significant Imaging: I have reviewed all pertinent imaging results/findings within the past 24 hours.  Assessment/Plan:     * Biliary obstruction  Admit to medical floor with telemetry.    NPO.    Inpatient gastroenterology consultation.      HTN (hypertension)  Patient's blood pressure range in the last 24 hours was: BP  Min: 134/68  Max: 160/84.The patient's inpatient anti-hypertensive regimen is listed below:  Current Antihypertensives  lisinopriL tablet 20 mg, Daily, Oral  metoprolol succinate (TOPROL-XL) 24 hr tablet 25 mg, Daily, Oral    Plan  - BP is controlled, no changes needed to their regimen  - monitor blood pressure closely.    Type 2 diabetes mellitus  Patient's FSGs are controlled on current medication regimen.  Last A1c reviewed-   Lab Results   Component Value Date    HGBA1C 5.8 10/20/2024     Most recent fingerstick glucose reviewed-   Recent Labs   Lab 02/27/25  1734   POCTGLUCOSE 151*     Current correctional scale  Low  Maintain anti-hyperglycemic dose as follows-   Antihyperglycemics (From admission, onward)      None          Hold Oral hypoglycemics while patient is in the hospital.    A-fib  Patient has paroxysmal (<7 days) atrial fibrillation. Patient is currently in sinus rhythm. [id number] Score: 4. The patients heart rate in the last 24 hours is as follows:  Pulse  Min: 75  Max: 96     Antiarrhythmics  metoprolol succinate (TOPROL-XL) 24 hr tablet 25 mg, Daily, Oral    Anticoagulants       Plan  - Replete lytes with a goal of K>4, Mg >2  - Patient is anticoagulated, see medications listed above.  - Patient's afib is currently controlled  - hold anticoagulation for possible GI intervention.        (HFpEF) heart failure with preserved ejection fraction  Strict I's  and O's.    Gentle diuresis with Lasix p.r.n..      Dementia  Patient with dementia with likely etiology of unknown dementia. Dementia is severe. The patient does not have signs of behavioral disturbance. Home dementia medications are Held or Continued: continued.. Continue non-pharmacologic interventions to prevent delirium (No VS between 11PM-5AM, activity during day, opening blinds, providing glasses/hearing aids, and up in chair during daytime). Will avoid narcotics and benzos unless absolutely necessary. PRN anti-psychotics are not prescribed to avoid self harm behaviors.      VTE Risk Mitigation (From admission, onward)           Ordered     IP VTE HIGH RISK PATIENT  Once         02/28/25 0335     Place sequential compression device  Until discontinued         02/28/25 0335                                    Tristan Herrera MD  Department of Hospital Medicine  [facility]

## 2025-02-28 NOTE — PLAN OF CARE
Reported off to [place], v/s  973  94  22  117/63  96%.  Patient will be transported back to room  531  via stretcher., NAD noted.

## 2025-02-28 NOTE — PROVATION PATIENT INSTRUCTIONS
Discharge Summary/Instructions after an Endoscopic Procedure  Patient Name: Megan Godoy  Patient MRN: [id number]  Patient YOB: 1935 Friday, February 28, 2025  Goyo Mai MD  Dear patient,  As a result of recent federal legislation (The Federal Cures Act), you may   receive lab or pathology results from your procedure in your [facility]   account before your physician is able to contact you. Your physician or   their representative will relay the results to you with their   recommendations at their soonest availability.  Thank you,  Your health is very important to us during the Covid Crisis. Following your   procedure today, you will receive a daily text for 2 weeks asking about   signs or symptoms of Covid 19.  Please respond to this text when you   receive it so we can follow up and keep you as safe as possible.   RESTRICTIONS:  During your procedure today, you received medications for sedation.  These   medications may affect your judgment, balance and coordination.  Therefore,   for 24 hours, you have the following restrictions:   - DO NOT drive a car, operate machinery, make legal/financial decisions,   sign important papers or drink alcohol.    ACTIVITY:  Today: no heavy lifting, straining or running due to procedural   sedation/anesthesia.  The following day: return to full activity including work.  DIET:  Eat and drink normally unless instructed otherwise.     TREATMENT FOR COMMON SIDE EFFECTS:  - Mild abdominal pain, nausea, belching, bloating or excessive gas:  rest,   eat lightly and use a heating pad.  - Sore Throat: treat with throat lozenges and/or gargle with warm salt   water.  - Because air was used during the procedure, expelling large amounts of air   from your rectum or belching is normal.  - If a bowel prep was taken, you may not have a bowel movement for 1-3 days.    This is normal.  SYMPTOMS TO WATCH FOR AND REPORT TO YOUR PHYSICIAN:  1. Abdominal pain or bloating, other  than gas cramps.  2. Chest pain.  3. Back pain.  4. Signs of infection such as: chills or fever occurring within 24 hours   after the procedure.  5. Rectal bleeding, which would show as bright red, maroon, or black stools.   (A tablespoon of blood from the rectum is not serious, especially if   hemorrhoids are present.)  6. Vomiting.  7. Weakness or dizziness.  GO DIRECTLY TO THE NEAREST EMERGENCY ROOM IF YOU HAVE ANY OF THE FOLLOWING:      Difficulty breathing              Chills and/or fever over 101 F   Persistent vomiting and/or vomiting blood   Severe abdominal pain   Severe chest pain   Black, tarry stools   Bleeding- more than one tablespoon   Any other symptom or condition that you feel may need urgent attention  Your doctor recommends these additional instructions:  If any biopsies were taken, your doctors clinic will contact you in 1 to 2   weeks with any results.  - Return patient to hospital [facility] for ongoing care.   - Resume previous diet.   - Continue present medications.  For questions, problems or results please call your physician - Goyo Mai MD.  EMERGENCY PHONE NUMBER: [phone],  LAB RESULTS: [phone]  IF A COMPLICATION OR EMERGENCY SITUATION ARISES AND YOU ARE UNABLE TO REACH   YOUR PHYSICIAN - GO DIRECTLY TO THE EMERGENCY ROOM.  Goyo Mai MD  2/28/2025 1:52:47 PM  This report has been verified and signed electronically.  Dear patient,  As a result of recent federal legislation (The Federal Cures Act), you may   receive lab or pathology results from your procedure in your [facility]   account before your physician is able to contact you. Your physician or   their representative will relay the results to you with their   recommendations at their soonest availability.  Thank you,  PROVATION

## 2025-02-28 NOTE — ASSESSMENT & PLAN NOTE
Patient's blood pressure range in the last 24 hours was: BP  Min: 134/68  Max: 160/84.The patient's inpatient anti-hypertensive regimen is listed below:  Current Antihypertensives  lisinopriL tablet 20 mg, Daily, Oral  metoprolol succinate (TOPROL-XL) 24 hr tablet 25 mg, Daily, Oral    Plan  - BP is controlled, no changes needed to their regimen  - monitor blood pressure closely.

## 2025-02-28 NOTE — ANESTHESIA PREPROCEDURE EVALUATION
[facility]  Anesthesia Pre-Operative Evaluation         Patient Name: Megan Godoy  YOB: 1935  MRN: [id number]    SUBJECTIVE:     Pre-operative evaluation for Procedure(s) (LRB):  ULTRASOUND, UPPER GI TRACT, ENDOSCOPIC (N/A)  ERCP (ENDOSCOPIC RETROGRADE CHOLANGIOPANCREATOGRAPHY) (N/A)     02/28/2025    Megan Godoy is a 89 y.o. female w/ a significant PMHx of HFpEF, pAfib, dementia, HTN, T2DM, now with biliary obstruction    Consent obtained from next of kin (Young Godoy - grand daughter)    Patient now presents for the above procedure(s).      LDA: None documented.       Peripheral IV - Single Lumen 02/27/25 1815 20 G 1 in No Left;Posterior Forearm (Active)   Site Assessment Clean;Dry;Intact 02/28/25 0719   Extremity Assessment Distal to IV No redness;No swelling;No warmth;No abnormal discoloration 02/28/25 0323   Line Status Saline locked 02/28/25 0719   Dressing Status Clean;Dry;Intact 02/28/25 0719   Dressing Intervention Integrity maintained 02/28/25 0719   Dressing Change Due 03/03/25 02/28/25 0323   Site Change Due 03/03/25 02/28/25 0323   Reason Not Rotated Not due 02/28/25 0323   Number of days: 0       Prev airway: None documented.    Drips: None documented.      Problem List[1]    Review of patient's allergies indicates:  No Known Allergies    Current Inpatient Medications:   atorvastatin  10 mg Oral Daily    lisinopriL  20 mg Oral Daily    metoprolol succinate  25 mg Oral Daily    pantoprazole  40 mg Oral Daily    pregabalin  50 mg Oral TID    sertraline  25 mg Oral Daily       Medications Ordered Prior to Encounter[2]    No past surgical history on file.    Social History[3]    OBJECTIVE:     Vital Signs Range (Last 24H):  Temp:  [36.9 °C (98.5 °F)-37.2 °C (99 °F)]   Pulse:  [75-96]   Resp:  [18-29]   BP: (134-160)/(68-96)   SpO2:  [95 %-100 %]       Significant Labs:  Lab Results   Component Value Date    WBC 8.40 02/28/2025    HGB 10.8 (L) 02/28/2025     HCT 31.1 (L) 02/28/2025     02/28/2025    CHOL 216 (H) 02/05/2024    TRIG 56 02/05/2024    HDL 63 02/05/2024    ALT 71 (H) 02/27/2025    AST 97 (H) 02/27/2025     02/28/2025    K 3.2 (L) 02/28/2025     02/28/2025    CREATININE 0.6 02/28/2025    BUN 2 (L) 02/28/2025    CO2 23 02/28/2025    INR 1.5 (H) 02/28/2025    HGBA1C 5.8 10/20/2024       Diagnostic Studies: No relevant studies.    EKG:   Results for orders placed or performed during the hospital encounter of 02/27/25   EKG 12-lead    Collection Time: 02/27/25  5:12 PM   Result Value Ref Range    QRS Duration 70 ms    [facility] QTC Calculation 453 ms    Narrative    Test Reason : R07.9,    Vent. Rate :  83 BPM     Atrial Rate :  78 BPM     P-R Int :    ms          QRS Dur :  70 ms      QT Int : 386 ms       P-R-T Axes :     28  -6 degrees    QTcB Int : 453 ms    Atrial fibrillation with a competing junctional pacemaker  Low voltage QRS  Cannot rule out Anterior infarct ,age undetermined  Abnormal ECG  When compared with ECG of 25-Nov-2024 15:51,  Minimal criteria for Anterior infarct are now Present    Referred By: AAAREFERRAL SELF           Confirmed By:        2D ECHO:  TTE:  No results found for this or any previous visit.    CHELA:  No results found for this or any previous visit.    ASSESSMENT/PLAN:           Pre-op Assessment    I have reviewed the Patient Summary Reports.       I have reviewed the Medications.     Review of Systems  Anesthesia Hx:   Neg history of prior surgery.          Denies Family Hx of Anesthesia complications.    Denies Personal Hx of Anesthesia complications.                    Hematology/Oncology:  Hematology Normal   Oncology Normal                                   EENT/Dental:  EENT/Dental Normal           Cardiovascular:     Hypertension    Dysrhythmias atrial fibrillation  CHF                                   Pulmonary:  Pulmonary Normal                       Renal/:  Renal/ Normal                  Hepatic/GI:  Hepatic/GI Normal                    Musculoskeletal:  Musculoskeletal Normal                Neurological:       Dementia                         Endocrine:  Diabetes           Dermatological:  Skin Normal    Psych:  Psychiatric Normal                    Physical Exam  General: Well nourished, Oriented, Alert and Cooperative    Airway:  Mallampati: II   Mouth Opening: Normal  TM Distance: Normal  Tongue: Normal  Neck ROM: Normal ROM    Dental:  Intact        Anesthesia Plan  Type of Anesthesia, risks & benefits discussed:    Anesthesia Type: Gen Natural Airway  Intra-op Monitoring Plan: Standard ASA Monitors  Induction:  IV  Informed Consent: Patient consented to blood products? No  ASA Score: 3  Day of Surgery Review of History & Physical: H&P Update referred to the surgeon/provider.    Ready For Surgery From Anesthesia Perspective.     .           [1]   Patient Active Problem List  Diagnosis    Biliary obstruction    Dementia    Type 2 diabetes mellitus    (HFpEF) heart failure with preserved ejection fraction    HTN (hypertension)    A-fib   [2]   No current facility-administered medications on file prior to encounter.     Current Outpatient Medications on File Prior to Encounter   Medication Sig Dispense Refill    albuterol-ipratropium (DUO-NEB) 2.5 mg-0.5 mg/3 mL nebulizer solution Take 3 mLs by nebulization every 6 (six) hours as needed for Shortness of Breath.      atorvastatin (LIPITOR) 10 MG tablet Take 10 mg by mouth once daily.      benazepriL (LOTENSIN) 20 MG tablet Take 20 mg by mouth once daily.      metoprolol succinate (TOPROL-XL) 25 MG 24 hr tablet Take 25 mg by mouth once daily.      pantoprazole (PROTONIX) 40 MG tablet Take 40 mg by mouth once daily.      pregabalin (LYRICA) 50 MG capsule Take 50 mg by mouth 3 (three) times daily.      sertraline (ZOLOFT) 25 MG tablet Take 25 mg by mouth once daily.      XARELTO 15 mg Tab Take 15 mg by mouth once daily.     [3]   Social  History  Socioeconomic History    Marital status:    Tobacco Use    Smoking status: Unknown   Substance and Sexual Activity    Alcohol use: Never     Social Drivers of Health     Financial Resource Strain: Patient Unable To Answer (2/28/2025)    Overall Financial Resource Strain (CARDIA)     Difficulty of Paying Living Expenses: Patient unable to answer   Food Insecurity: Patient Unable To Answer (2/28/2025)    Hunger Vital Sign     Worried About Running Out of Food in the Last Year: Patient unable to answer     Ran Out of Food in the Last Year: Patient unable to answer   Stress: Patient Unable To Answer (2/28/2025)    [employer] - Occupational Stress Questionnaire     Feeling of Stress : Patient unable to answer   Housing Stability: Patient Unable To Answer (2/28/2025)    Housing Stability Vital Sign     Unable to Pay for Housing in the Last Year: Patient unable to answer     Homeless in the Last Year: Patient unable to answer

## 2025-02-28 NOTE — ASSESSMENT & PLAN NOTE
Patient has paroxysmal (<7 days) atrial fibrillation. Patient is currently in sinus rhythm. [id number] Score: 4. The patients heart rate in the last 24 hours is as follows:  Pulse  Min: 75  Max: 96     Antiarrhythmics  metoprolol succinate (TOPROL-XL) 24 hr tablet 25 mg, Daily, Oral    Anticoagulants       Plan  - Replete lytes with a goal of K>4, Mg >2  - Patient is anticoagulated, see medications listed above.  - Patient's afib is currently controlled  - hold anticoagulation for possible GI intervention.

## 2025-02-28 NOTE — HPI
Megan Godoy is a 89 y.o. female with past medical history of CHF, AFib, dementia, anemia and hypertension.  [facility]  Per EMS, nursing home reported patient having yellowing of skin.  In ED patient had CT scan of abdomen that showed distended gallbladder containing stones and sludge, intra and extrahepatic biliary dilation and mild pancreatic ductal dilation.  Patient transferred to [facility] for higher level of care.  Patient is a poor historian and most of the history is obtained from previous medical records.

## 2025-02-28 NOTE — PROVATION PATIENT INSTRUCTIONS
Discharge Summary/Instructions after an Endoscopic Procedure  Patient Name: Megan Godoy  Patient MRN: [id number]  Patient YOB: 1935 Friday, February 28, 2025  Goyo Mai MD  Dear patient,  As a result of recent federal legislation (The Federal Cures Act), you may   receive lab or pathology results from your procedure in your [facility]   account before your physician is able to contact you. Your physician or   their representative will relay the results to you with their   recommendations at their soonest availability.  Thank you,  Your health is very important to us during the Covid Crisis. Following your   procedure today, you will receive a daily text for 2 weeks asking about   signs or symptoms of Covid 19.  Please respond to this text when you   receive it so we can follow up and keep you as safe as possible.   RESTRICTIONS:  During your procedure today, you received medications for sedation.  These   medications may affect your judgment, balance and coordination.  Therefore,   for 24 hours, you have the following restrictions:   - DO NOT drive a car, operate machinery, make legal/financial decisions,   sign important papers or drink alcohol.    ACTIVITY:  Today: no heavy lifting, straining or running due to procedural   sedation/anesthesia.  The following day: return to full activity including work.  DIET:  Eat and drink normally unless instructed otherwise.     TREATMENT FOR COMMON SIDE EFFECTS:  - Mild abdominal pain, nausea, belching, bloating or excessive gas:  rest,   eat lightly and use a heating pad.  - Sore Throat: treat with throat lozenges and/or gargle with warm salt   water.  - Because air was used during the procedure, expelling large amounts of air   from your rectum or belching is normal.  - If a bowel prep was taken, you may not have a bowel movement for 1-3 days.    This is normal.  SYMPTOMS TO WATCH FOR AND REPORT TO YOUR PHYSICIAN:  1. Abdominal pain or bloating, other  than gas cramps.  2. Chest pain.  3. Back pain.  4. Signs of infection such as: chills or fever occurring within 24 hours   after the procedure.  5. Rectal bleeding, which would show as bright red, maroon, or black stools.   (A tablespoon of blood from the rectum is not serious, especially if   hemorrhoids are present.)  6. Vomiting.  7. Weakness or dizziness.  GO DIRECTLY TO THE NEAREST EMERGENCY ROOM IF YOU HAVE ANY OF THE FOLLOWING:      Difficulty breathing              Chills and/or fever over 101 F   Persistent vomiting and/or vomiting blood   Severe abdominal pain   Severe chest pain   Black, tarry stools   Bleeding- more than one tablespoon   Any other symptom or condition that you feel may need urgent attention  Your doctor recommends these additional instructions:  If any biopsies were taken, your doctors clinic will contact you in 1 to 2   weeks with any results.  - Discharge patient to home.   - Resume previous diet.   - Continue present medications.   - Await path results.   - Perform an ERCP today.  For questions, problems or results please call your physician - Goyo Mai MD.  EMERGENCY PHONE NUMBER: [phone],  LAB RESULTS: [phone]  IF A COMPLICATION OR EMERGENCY SITUATION ARISES AND YOU ARE UNABLE TO REACH   YOUR PHYSICIAN - GO DIRECTLY TO THE EMERGENCY ROOM.  Goyo Mai MD  2/28/2025 1:48:31 PM  This report has been verified and signed electronically.  Dear patient,  As a result of recent federal legislation (The Federal Cures Act), you may   receive lab or pathology results from your procedure in your [facility]   account before your physician is able to contact you. Your physician or   their representative will relay the results to you with their   recommendations at their soonest availability.  Thank you,  PROVATION

## 2025-02-28 NOTE — ASSESSMENT & PLAN NOTE
Patient's FSGs are controlled on current medication regimen.  Last A1c reviewed-   Lab Results   Component Value Date    HGBA1C 5.8 10/20/2024     Most recent fingerstick glucose reviewed-   Recent Labs   Lab 02/27/25  1734   POCTGLUCOSE 151*     Current correctional scale  Low  Maintain anti-hyperglycemic dose as follows-   Antihyperglycemics (From admission, onward)      None          Hold Oral hypoglycemics while patient is in the hospital.

## 2025-02-28 NOTE — PROVIDER TRANSFER
Outside Transfer Acceptance Note / Regional Referral Center    Referring facility: [facility]   Referring provider: THERESA CALABRESE SARA  Accepting facility: [facility]  Accepting provider: DENISE MONTAGUE BRITTANY R.  Admitting provider: Dr. Saeid Montano  Reason for transfer: Higher Level of Care   Transfer diagnosis: Biliary obstruction  Transfer specialty requested: Gastroenterology  Transfer specialty notified: Yes  Transfer level: NUMBER 1-5: 2  Bed type requested: med-surg  Isolation status: No active isolations   Admission class or status: IP- Inpatient      Narrative      89 y.o. female with a history of heart failure, diabetes, AFib, dementia, anemia and hypertension presenting to the ED with abnormal labs.  Per EMS nursing home reported yellowing of skin times unknown time.  With elevated GGT.  Abdominal ultrasound was done which found significant gallbladder sludge with dilation of hepatic veins and a normal common bile duct size.      Labs notable for Tbili 19.5, AST 97, ALT 71, . INR 3.3. CT showed Distended gallbladder containing stones and sludge. Intrahepatic and extrahepatic biliary ductal dilatation. Mild pancreatic ductal dilatation. No discrete pancreatic mass.     Transfer request for MRI/MRCP.     Objective     Vitals: Temp: 99 °F (37.2 °C) (02/28/25 0309)  Pulse: 75 (02/28/25 0309)  Resp: 20 (02/28/25 0309)  BP: (!) 143/75 (02/28/25 0309)  SpO2: 95 % (02/28/25 0309)  Recent Labs: All pertinent labs within the past 24 hours have been reviewed.  Recent imaging:      Airway:     Vent settings:         IV access:        Peripheral IV - Single Lumen 02/27/25 1815 20 G 1 in No Left;Posterior Forearm (Active)   Site Assessment Clean;Dry;Intact;No redness;No swelling 02/28/25 0323   Extremity Assessment Distal to IV No redness;No swelling;No warmth;No abnormal discoloration 02/28/25 0323   Line Status Saline  locked 02/28/25 0323   Dressing Status Clean;Dry;Intact 02/28/25 0323   Dressing Intervention Integrity maintained 02/28/25 0323   Dressing Change Due 03/03/25 02/28/25 0323   Site Change Due 03/03/25 02/28/25 0323   Reason Not Rotated Not due 02/28/25 0323     Infusions: N/A  Allergies: Review of patient's allergies indicates:  No Known Allergies   NPO: Yes    Anticoagulation:   Anticoagulants       None             Instructions      [facility]  Admit to Hospital Medicine  Upon patient arrival to floor, please contact Hospital Medicine on call.     Consult GI

## 2025-02-28 NOTE — SUBJECTIVE & OBJECTIVE
No past medical history on file.    No past surgical history on file.    Review of patient's allergies indicates:  No Known Allergies    Current Facility-Administered Medications on File Prior to Encounter   Medication    [COMPLETED] iohexoL (OMNIPAQUE 350) injection 100 mL    [DISCONTINUED] phytonadione vitamin k (AQUA-MEPHYTON) 10 mg in D5W 50 mL IVPB     Current Outpatient Medications on File Prior to Encounter   Medication Sig    albuterol-ipratropium (DUO-NEB) 2.5 mg-0.5 mg/3 mL nebulizer solution Take 3 mLs by nebulization every 6 (six) hours as needed for Shortness of Breath.    atorvastatin (LIPITOR) 10 MG tablet Take 10 mg by mouth once daily.    benazepriL (LOTENSIN) 20 MG tablet Take 20 mg by mouth once daily.    metoprolol succinate (TOPROL-XL) 25 MG 24 hr tablet Take 25 mg by mouth once daily.    pantoprazole (PROTONIX) 40 MG tablet Take 40 mg by mouth once daily.    pregabalin (LYRICA) 50 MG capsule Take 50 mg by mouth 3 (three) times daily.    sertraline (ZOLOFT) 25 MG tablet Take 25 mg by mouth once daily.    XARELTO 15 mg Tab Take 15 mg by mouth once daily.     Family History    None       Tobacco Use    Smoking status: Unknown    Smokeless tobacco: Not on file   Substance and Sexual Activity    Alcohol use: Never    Drug use: Not on file    Sexual activity: Not on file     Review of Systems   Unable to perform ROS: Dementia     Objective:     Vital Signs (Most Recent):  Temp: 99 °F (37.2 °C) (02/28/25 0309)  Pulse: 75 (02/28/25 0309)  Resp: 20 (02/28/25 0309)  BP: (!) 143/75 (02/28/25 0309)  SpO2: 95 % (02/28/25 0309) Vital Signs (24h Range):  Temp:  [98.5 °F (36.9 °C)-99 °F (37.2 °C)] 99 °F (37.2 °C)  Pulse:  [75-96] 75  Resp:  [18-29] 20  SpO2:  [95 %-100 %] 95 %  BP: (134-160)/(68-84) 143/75     Weight: 50 kg (110 lb 3.7 oz)  Body mass index is 20.16 kg/m².     Physical Exam  HENT:      Head: Normocephalic and atraumatic.      Nose: Nose normal.      Mouth/Throat:      Mouth: Mucous membranes  are moist.   Eyes:      Pupils: Pupils are equal, round, and reactive to light.   Cardiovascular:      Rate and Rhythm: Regular rhythm.   Pulmonary:      Breath sounds: Normal breath sounds.   Abdominal:      Tenderness: There is abdominal tenderness (epigastric).   Musculoskeletal:         General: Normal range of motion.      Cervical back: Neck supple.   Skin:     General: Skin is warm.   Neurological:      Mental Status: She is alert. She is disoriented.   Psychiatric:         Mood and Affect: Mood normal.              CRANIAL NERVES     CN III, IV, VI   Pupils are equal, round, and reactive to light.       Significant Labs: All pertinent labs within the past 24 hours have been reviewed.  Recent Lab Results  (Last 5 results in the past 24 hours)        02/27/25  1922   02/27/25  1915   02/27/25  1758   02/27/25  1744   02/27/25  1742        Influenza A, Molecular       Negative         Influenza B, Molecular       Negative         Albumin         3.3       ALP         571       Allens Test     N/A           ALT         71       Anion Gap         7       Aniso         Slight       Appearance, UA   Hazy             AST         97       Baso #         0.02       Basophil %         0.3       Bilirubin (UA)   3+  Comment: Positive urine bilirubin is not confirmed. Correlate with   serum bilirubin and clinical presentation.               BILIRUBIN TOTAL         19.5  Comment: For infants and newborns, interpretation of results should be based  on gestational age, weight and in agreement with clinical  observations.    Premature Infant recommended reference ranges:  Up to 24 hours.............<8.0 mg/dL  Up to 48 hours............<12.0 mg/dL  3-5 days..................<15.0 mg/dL  6-29 days.................<15.0 mg/dL         Site     Other           BUN         6       Calcium         8.8       Chloride         104       CO2         28       Color, UA   Yellow             Creatinine         0.6       [facility]  Air           Differential Method         Automated       eGFR         >60.0       Eos #         0.1       Eos %         1.7       Flu A & B Source       Nasal swab         Glucose         133       Glucose, UA   Negative             Gran # (ANC)         4.9       Gran %         68.5       Hematocrit         32.2       Hemoglobin         10.8       Hyaline Casts, UA   1             Immature Grans (Abs)         0.04  Comment: Mild elevation in immature granulocytes is non specific and   can be seen in a variety of conditions including stress response,   acute inflammation, trauma and pregnancy. Correlation with other   laboratory and clinical findings is essential.         Immature Granulocytes         0.6       INR         3.3  Comment: Coumadin Therapy:  2.0 - 3.0 for INR for all indicators except mechanical heart valves  and antiphospholipid syndromes which should use 2.5 - 3.5.         Ketones, UA   Negative             Lactic Acid Level 1.2  Comment: Falsely low lactic acid results can be found in samples   containing >=13.0 mg/dL total bilirubin and/or >=3.5 mg/dL   direct bilirubin.                 Leukocyte Esterase, UA   3+             Lipase         <3       Lymph #         1.4       Lymph %         19.8       MCH         28.6       MCHC         33.5       MCV         85       Microscopic Comment   SEE COMMENT  Comment: Other formed elements not mentioned in the report are not   present in the microscopic examination.                Mode     SPONT           Mono #         0.7       Mono %         9.1       MPV         11.3       NITRITE UA   Negative             nRBC         0       Blood, UA   TRACE             pH, UA   6.0             Platelet Estimate         Appears normal       Platelet Count         332       POC BE     6           POC HCO3     30.8           POC PCO2     49.7           POC PH     7.401           POC PO2     29           POC SATURATED O2     54           POC TCO2     32            Poikilocytosis         Marked       Potassium         3.6       PROTEIN TOTAL         6.8       Protein, UA   Trace  Comment: Recommend a 24 hour urine protein or a urine   protein/creatinine ratio if globulin induced proteinuria is  clinically suspected.               PT         34.0       RBC         3.78       RBC, UA   2             RDW         22.2       Sample     VENOUS           Sodium         139       Sp02     100           Spec Grav UA   1.020             Specimen UA   Urine, Clean Catch             Squam Epithel, UA   15             Target Cells         Marked       Troponin I High Sensitivity         12.4  Comment: Troponin results differ between methods. Do not use   results between Troponin methods interchangeably.    Alkaline Phospatase levels above 400 U/L may   cause false positive results.    Access hsTnI should not be used for patients taking   Asfotase kamila (Strensiq).         Unclassified Crystals   3             UROBILINOGEN UA   Negative             WBC, UA   100             WBC         7.11                              Significant Imaging: I have reviewed all pertinent imaging results/findings within the past 24 hours.

## 2025-02-28 NOTE — PLAN OF CARE
Problem: Adult Inpatient Plan of Care  Goal: Patient-Specific Goal (Individualized)  Outcome: Progressing     Problem: UTI (Urinary Tract Infection)  Goal: Improved Infection Symptoms  Outcome: Progressing     Problem: Fall Injury Risk  Goal: Absence of Fall and Fall-Related Injury  Outcome: Progressing     Problem: Comorbidity Management  Goal: Maintenance of Behavioral Health Symptom Control  Outcome: Progressing     Problem: Skin Injury Risk Increased  Goal: Skin Health and Integrity  Outcome: Progressing

## 2025-02-28 NOTE — PROGRESS NOTES
[facility] Medicine  Progress Note    Patient Name: Megan Godoy  MRN: [id number]  Patient Class: IP- Inpatient   Admission Date: 2/28/2025  Length of Stay: 0 days  Attending Physician: Saeid Montano,*  Primary Care Provider: Yamileth, Primary Doctor        Subjective     Principal Problem:Biliary obstruction        HPI:  Megan Godoy is a 89 y.o. female with past medical history of CHF, AFib, dementia, anemia and hypertension.  Patient presents to [facility]ED with abnormal labs.  Per EMS, nursing home reported patient having yellowing of skin.  In ED patient had CT scan of abdomen that showed distended gallbladder containing stones and sludge, intra and extrahepatic biliary dilation and mild pancreatic ductal dilation.  Patient transferred to [facility] for higher level of care.  Patient is a poor historian and most of the history is obtained from previous medical records.    Overview/Hospital Course:  No notes on file    Interval History:  Nursing notes reviewed; NAEON.    Patient seen on AM rounds and found in no acute distress; No new complaints noted.   Afebrile; VSS.  Seen by GI today, s/p AUS with fine-needle biopsy of pancreatic mass; s/p ERCP with biliary stricture and mass causing obstruction treated with stent.  Appreciate assistance.      Review of Systems   Unable to perform ROS: Dementia     Objective:     Vital Signs (Most Recent):  Temp: 98.7 °F (37.1 °C) (02/28/25 1113)  Pulse: 82 (02/28/25 1420)  Resp: 15 (02/28/25 1420)  BP: 106/77 (02/28/25 1420)  SpO2: (!) 93 % (02/28/25 1420) Vital Signs (24h Range):  Temp:  [98.5 °F (36.9 °C)-99 °F (37.2 °C)] 98.7 °F (37.1 °C)  Pulse:  [75-96] 82  Resp:  [15-29] 15  SpO2:  [93 %-98 %] 93 %  BP: (106-160)/(63-96) 106/77     Weight: 50 kg (110 lb 3.7 oz)  Body mass index is 20.16 kg/m².  No intake or output data in the 24 hours ending 02/28/25 1704      Physical Exam  Vitals and nursing note reviewed.   HENT:       Head: Normocephalic and atraumatic.      Nose: Nose normal.      Mouth/Throat:      Mouth: Mucous membranes are moist.   Eyes:      Pupils: Pupils are equal, round, and reactive to light.   Cardiovascular:      Rate and Rhythm: Regular rhythm.   Pulmonary:      Breath sounds: Normal breath sounds.   Abdominal:      Tenderness: There is abdominal tenderness (epigastric).   Musculoskeletal:         General: Normal range of motion.      Cervical back: Neck supple.   Skin:     General: Skin is warm.   Neurological:      Mental Status: She is alert. She is disoriented.   Psychiatric:         Mood and Affect: Mood normal.             Significant Labs: All pertinent labs within the past 24 hours have been reviewed.    Significant Imaging: I have reviewed all pertinent imaging results/findings within the past 24 hours.    Assessment and Plan     * Biliary obstruction  Admit to medical floor with telemetry.    Inpatient gastroenterology consultation.  2/28 s/p AUS with fine-needle biopsy of pancreatic mass; s/p ERCP with biliary stricture and mass causing obstruction treated with stent.     2/28 EUS:   Impression:            - Normal esophagus.                          - Normal stomach.                          - Normal examined duodenum.                          - A mass was identified in the pancreatic head.                          This was staged T3 N0 Mx by endosonographic                          criteria. The staging applies if malignancy is                          confirmed. Fine needle biopsy performed.   Recommendation:        - Discharge patient to home.                          - Resume previous diet.                          - Continue present medications.                          - Await path results.                          - Perform an ERCP today.     2/28 ERCP:  Impression:            - A single localized biliary stricture was found                          in the lower third of the main bile duct.  The                          stricture was malignant appearing.                          - The upper third of the main bile duct and middle                          third of the main bile duct were dilated, with a                          mass causing an obstruction.                          - One uncovered metal stent was placed into the                          common bile duct.   Recommendation:        - Return [facility] to [facility] for [facility] care.                          - Resume previous diet.                          - Continue present medications.       A-fib  Patient has paroxysmal (<7 days) atrial fibrillation. Patient is currently in sinus rhythm. [id number] Score: 4. The patients heart rate in the last 24 hours is as follows:  Pulse  Min: 75  Max: 96     Antiarrhythmics  metoprolol succinate (TOPROL-XL) 24 hr tablet 25 mg, Daily, Oral    Anticoagulants       Plan  - Replete lytes with a goal of K>4, Mg >2  - Patient is anticoagulated, see medications listed above.  - Patient's afib is currently controlled  - restart anticoagulation in a.m.        HTN (hypertension)  Patient's blood pressure range in the last 24 hours was: BP  Min: 117/63  Max: 160/84.The patient's inpatient anti-hypertensive regimen is listed below:  Current Antihypertensives  lisinopriL tablet 20 mg, Daily, Oral  metoprolol succinate (TOPROL-XL) 24 hr tablet 25 mg, Daily, Oral    Plan  - BP is controlled, no changes needed to their regimen  - monitor blood pressure closely.    (HFpEF) heart failure with preserved ejection fraction  Strict I's and O's.    Gentle diuresis with Lasix p.r.n..          Type 2 diabetes mellitus  Patient's FSGs are controlled on current medication regimen.  Last A1c reviewed-   Lab Results   Component Value Date    HGBA1C 5.8 10/20/2024     Most recent fingerstick glucose reviewed-   Recent Labs   Lab 02/27/25  1734   POCTGLUCOSE 151*     Current correctional scale  Low  Maintain anti-hyperglycemic dose  as follows-   Antihyperglycemics (From admission, onward)      Start     Stop Route Frequency Ordered    02/28/25 1810  insulin aspart U-100 pen 0-5 Units         -- SubQ Before meals & nightly PRN 02/28/25 1711          Hold Oral hypoglycemics while patient is in the hospital.    Dementia  Patient with dementia with likely etiology of unknown dementia. Dementia is severe. The patient does not have signs of behavioral disturbance. Home dementia medications are Held or Continued: continued.. Continue non-pharmacologic interventions to prevent delirium (No VS between 11PM-5AM, activity during day, opening blinds, providing glasses/hearing aids, and up in chair during daytime). Will avoid narcotics and benzos unless absolutely necessary. PRN anti-psychotics are not prescribed to avoid self harm behaviors.      VTE Risk Mitigation (From admission, onward)           Ordered     IP VTE HIGH RISK PATIENT  Once         02/28/25 0335     Place sequential compression device  Until discontinued         02/28/25 0335                    Discharge Planning   MAGY:      Code Status: Full Code   Medical Readiness for Discharge Date:   Discharge Plan A: Return to nursing home                        Jose Hilton PA-C  Department of Hospital Medicine   [place] [facility] Surg

## 2025-02-28 NOTE — PLAN OF CARE
Rounded at bedside. Pt is very [place] and only oriented to name. She is a resident of [facility] in [place] and was transferred to this hospital from [facility] for ERCP. Per GI notes, Plan for EUS +/- ERCP today. CM will continue to follow pt and provide any discharge needs.     [place] - Med Surg  Initial Discharge Assessment       Primary Care Provider: Yamileth, Primary Doctor    Admission Diagnosis: Biliary obstruction [K83.1]    Admission Date: 2/28/2025  Expected Discharge Date:          Payor: [employer] MANAGED MEDICARE / Plan: [employer] HMO PPO SPECIAL NEEDS / Product Type: Medicare Advantage /     Extended Emergency Contact Information  Primary Emergency Contact: Young Godoy  Mobile Phone: [phone]  Relation: Grandchild  Preferred language: English    Discharge Plan A: (P) Return to nursing home         OBDULIA Chambers [place], [place] - [address]  [address]  [address]  [facility] [address]  Phone: [phone] Fax: [phone]

## 2025-02-28 NOTE — NURSING
Attempted to call [facility], where patient resides per transferring facility notes, for them to fax a copy of patient's current medications. Called [phone], no answer, no answering service/machine. Will keep trying to contact NH. Also called [facility] [phone] to ask if they had a copy they did not see anything in her record but would look and if they found anything they will fax to  fax at [phone].

## 2025-02-28 NOTE — PLAN OF CARE
Problem: Adult Inpatient Plan of Care  Goal: Plan of Care Review  Outcome: Progressing  Goal: Patient-Specific Goal (Individualized)  Outcome: Progressing  Goal: Absence of Hospital-Acquired Illness or Injury  Outcome: Progressing  Goal: Optimal Comfort and Wellbeing  Outcome: Progressing  Goal: Readiness for Transition of Care  Outcome: Progressing     Problem: Fall Injury Risk  Goal: Absence of Fall and Fall-Related Injury  Outcome: Progressing     Problem: Diabetes  Goal: Optimal Coping  Outcome: Progressing  Goal: Optimal Functional Ability  Outcome: Progressing  Goal: Blood Glucose Level Within Target Range  Outcome: Progressing  Goal: Minimize Risk of Hypoglycemia  Outcome: Progressing     Problem: Skin Injury Risk Increased  Goal: Skin Health and Integrity  Outcome: Progressing    Patient is AAOx1, NAD noted, VSS.  No complaints of nausea, vomiting throughout shift.  PRN  pain medication given per MAR.  Tolerating diet, oral intake encouraged. Labs and vitals being monitored.  Accuchecks being monitored. Zflex boots in place.  Patient went to endo today. Patient free from falls throughout shift, bed alarm on, bed is in lowest position, wheels locked, call light within reach, safety maintained.  Will continue to monitor.

## 2025-02-28 NOTE — SUBJECTIVE & OBJECTIVE
Interval History:  Nursing notes reviewed; MACARIO.    Patient seen on AM rounds and found in no acute distress; No new complaints noted.   Afebrile; VSS.  Seen by GI today, s/p AUS with fine-needle biopsy of pancreatic mass; s/p ERCP with biliary stricture and mass causing obstruction treated with stent.  Appreciate assistance.      Review of Systems   Unable to perform ROS: Dementia     Objective:     Vital Signs (Most Recent):  Temp: 98.7 °F (37.1 °C) (02/28/25 1113)  Pulse: 82 (02/28/25 1420)  Resp: 15 (02/28/25 1420)  BP: 106/77 (02/28/25 1420)  SpO2: (!) 93 % (02/28/25 1420) Vital Signs (24h Range):  Temp:  [98.5 °F (36.9 °C)-99 °F (37.2 °C)] 98.7 °F (37.1 °C)  Pulse:  [75-96] 82  Resp:  [15-29] 15  SpO2:  [93 %-98 %] 93 %  BP: (106-160)/(63-96) 106/77     Weight: 50 kg (110 lb 3.7 oz)  Body mass index is 20.16 kg/m².  No intake or output data in the 24 hours ending 02/28/25 1704      Physical Exam  Vitals and nursing note reviewed.   HENT:      Head: Normocephalic and atraumatic.      Nose: Nose normal.      Mouth/Throat:      Mouth: Mucous membranes are moist.   Eyes:      Pupils: Pupils are equal, round, and reactive to light.   Cardiovascular:      Rate and Rhythm: Regular rhythm.   Pulmonary:      Breath sounds: Normal breath sounds.   Abdominal:      Tenderness: There is abdominal tenderness (epigastric).   Musculoskeletal:         General: Normal range of motion.      Cervical back: Neck supple.   Skin:     General: Skin is warm.   Neurological:      Mental Status: She is alert. She is disoriented.   Psychiatric:         Mood and Affect: Mood normal.             Significant Labs: All pertinent labs within the past 24 hours have been reviewed.    Significant Imaging: I have reviewed all pertinent imaging results/findings within the past 24 hours.

## 2025-02-28 NOTE — SUBJECTIVE & OBJECTIVE
No past medical history on file.    No past surgical history on file.    Review of patient's allergies indicates:  No Known Allergies  Family History    None       Tobacco Use    Smoking status: Unknown    Smokeless tobacco: Not on file   Substance and Sexual Activity    Alcohol use: Never    Drug use: Not on file    Sexual activity: Not on file     Review of Systems   Gastrointestinal:  Positive for abdominal pain.   Musculoskeletal:  Positive for arthralgias.     Objective:     Vital Signs (Most Recent):  Temp: 98.7 °F (37.1 °C) (02/28/25 1113)  Pulse: 77 (02/28/25 1113)  Resp: 18 (02/28/25 1113)  BP: 139/88 (02/28/25 1113)  SpO2: 96 % (02/28/25 1113) Vital Signs (24h Range):  Temp:  [98.5 °F (36.9 °C)-99 °F (37.2 °C)] 98.7 °F (37.1 °C)  Pulse:  [75-96] 77  Resp:  [18-29] 18  SpO2:  [95 %-100 %] 96 %  BP: (134-160)/(68-96) 139/88     Weight: 50 kg (110 lb 3.7 oz) (02/28/25 0309)  Body mass index is 20.16 kg/m².    No intake or output data in the 24 hours ending 02/28/25 1236    Lines/Drains/Airways       Peripheral Intravenous Line  Duration                  Peripheral IV - Single Lumen 02/27/25 1815 20 G 1 in No Left;Posterior Forearm <1 day                     Physical Exam  Constitutional:       Appearance: She is well-developed.   HENT:      Head: Normocephalic and atraumatic.   Eyes:      Conjunctiva/sclera: Conjunctivae normal.   Pulmonary:      Effort: Pulmonary effort is normal. No respiratory distress.   Musculoskeletal:      Cervical back: Normal range of motion.   Neurological:      Mental Status: She is alert and oriented to person, place, and time.   Psychiatric:         Behavior: Behavior normal.         Thought Content: Thought content normal.         Judgment: Judgment normal.          Significant Labs:  Recent Lab Results  (Last 5 results in the past 24 hours)        02/28/25  0550   02/28/25  0356   02/27/25  1922   02/27/25  1915   02/27/25  1758        Allens Test         N/A       Anion Gap    11             Appearance, UA       Hazy         Baso #   0.02             Basophil %   0.2             Bilirubin (UA)       3+  Comment: Positive urine bilirubin is not confirmed. Correlate with   serum bilirubin and clinical presentation.           Site         Other       BUN   2             Calcium   9.0             Chloride   105             CO2   23             Color, UA       Yellow         Creatinine   0.6             DelSys         Room Air       Differential Method   Automated             eGFR   >60             Eos #   0.1             Eos %   1.1             Glucose   103             Glucose, UA       Negative         Gran # (ANC)   5.8             Gran %   69.1             Hematocrit   31.1             Hemoglobin   10.8             Hyaline Casts, UA       1         Immature Grans (Abs)   0.04  Comment: Mild elevation in immature granulocytes is non specific and   can be seen in a variety of conditions including stress response,   acute inflammation, trauma and pregnancy. Correlation with other   laboratory and clinical findings is essential.               Immature Granulocytes   0.5             INR 1.5  Comment: Coumadin Therapy:  2.0 - 3.0 for INR for all indicators except mechanical heart valves  and antiphospholipid syndromes which should use 2.5 - 3.5.  LOT^040^PT Inn^[id number]                 Ketones, UA       Negative         Lactic Acid Level     1.2  Comment: Falsely low lactic acid results can be found in samples   containing >=13.0 mg/dL total bilirubin and/or >=3.5 mg/dL   direct bilirubin.             Leukocyte Esterase, UA       3+         Lymph #   1.7             Lymph %   19.6             MCH   29.3             MCHC   34.7             MCV   84             Microscopic Comment       SEE COMMENT  Comment: Other formed elements not mentioned in the report are not   present in the microscopic examination.            Mode         SPONT       Mono #   0.8             Mono %   9.5             MPV    11.4             NITRITE UA       Negative         nRBC   0             Blood, UA       TRACE         pH, UA       6.0         Platelet Count   316             POC BE         6       POC HCO3         30.8       POC PCO2         49.7       POC PH         7.401       POC PO2         29       POC SATURATED O2         54       POC TCO2         32       Potassium   3.2             Protein, UA       Trace  Comment: Recommend a 24 hour urine protein or a urine   protein/creatinine ratio if globulin induced proteinuria is  clinically suspected.           PT 16.5               RBC   3.69             RBC, UA       2         RDW   22.2             Sample         VENOUS       Sodium   139             Sp02         100       Spec Grav UA       1.020         Specimen UA       Urine, Clean Catch         Squam Epithel, UA       15         Unclassified Crystals       3         UROBILINOGEN UA       Negative         WBC, UA       100         WBC   8.40                                    Significant Imaging:  Imaging results within the past 24 hours have been reviewed.

## 2025-02-28 NOTE — H&P
Short Stay Endoscopy History and Physical    PCP - No, Primary Doctor  Referring Physician - Kriss Gonsalez MD  [address] [place] [address]    Procedure - eus/ercp  ASA - per anesthesia  Mallampati - per anesthesia  History of Anesthesia problems - no  Family history Anesthesia problems -  no   Plan of anesthesia - General    HPI:  This is a 89 y.o. female here for evaluation of: obstructive jaundice    Reflux - no  Dysphagia - no  Abdominal pain - no  Diarrhea - no    ROS:  Constitutional: No fevers, chills, No weight loss  CV: No chest pain  Pulm: No cough, No shortness of breath  Ophtho: No vision changes  GI: see HPI  Derm: No rash    Medical History:  has no past medical history on file.    Surgical History:  has no past surgical history on file.    Family History: family history is not on file..    Social History:  reports that she does not drink alcohol.    Review of patient's allergies indicates:  No Known Allergies    Medications:   Prescriptions Prior to Admission[1]    Physical Exam:    Vital Signs:   Vitals:    02/28/25 1113   BP: 139/88   Pulse: 77   Resp: 18   Temp: 98.7 °F (37.1 °C)       General Appearance: Well appearing in no acute distress    Labs:  Lab Results   Component Value Date    WBC 8.40 02/28/2025    HGB 10.8 (L) 02/28/2025    HCT 31.1 (L) 02/28/2025     02/28/2025    CHOL 216 (H) 02/05/2024    TRIG 56 02/05/2024    HDL 63 02/05/2024    ALT 71 (H) 02/27/2025    AST 97 (H) 02/27/2025     02/28/2025    K 3.2 (L) 02/28/2025     02/28/2025    CREATININE 0.6 02/28/2025    BUN 2 (L) 02/28/2025    CO2 23 02/28/2025    INR 1.5 (H) 02/28/2025    HGBA1C 5.8 10/20/2024       I have explained the risks and benefits of this endoscopic procedure to the patient including but not limited to bleeding, inflammation, infection, perforation, and death.      Goyo Mai MD        [1]   Medications Prior to Admission   Medication Sig Dispense Refill Last  Dose/Taking    albuterol-ipratropium (DUO-NEB) 2.5 mg-0.5 mg/3 mL nebulizer solution Take 3 mLs by nebulization every 6 (six) hours as needed for Shortness of Breath.       atorvastatin (LIPITOR) 10 MG tablet Take 10 mg by mouth once daily.       benazepriL (LOTENSIN) 20 MG tablet Take 20 mg by mouth once daily.       metoprolol succinate (TOPROL-XL) 25 MG 24 hr tablet Take 25 mg by mouth once daily.       pantoprazole (PROTONIX) 40 MG tablet Take 40 mg by mouth once daily.       pregabalin (LYRICA) 50 MG capsule Take 50 mg by mouth 3 (three) times daily.       sertraline (ZOLOFT) 25 MG tablet Take 25 mg by mouth once daily.       XARELTO 15 mg Tab Take 15 mg by mouth once daily.

## 2025-02-28 NOTE — TRANSFER OF CARE
"Anesthesia Transfer of Care Note    Patient: Megan Godoy    Procedure(s) Performed: Procedure(s) (LRB):  ULTRASOUND, UPPER GI TRACT, ENDOSCOPIC (N/A)  ERCP (ENDOSCOPIC RETROGRADE CHOLANGIOPANCREATOGRAPHY) (N/A)    Patient location: GI    Anesthesia Type: general    Transport from OR: Transported from OR on room air with adequate spontaneous ventilation    Post pain: adequate analgesia    Post assessment: no apparent anesthetic complications and tolerated procedure well    Post vital signs: stable    Level of consciousness: awake, alert and oriented    Nausea/Vomiting: no nausea/vomiting    Complications: none    Transfer of care protocol was followed      Last vitals: Visit Vitals  /88   Pulse 77   Temp 37.1 °C (98.7 °F) (Oral)   Resp 18   Ht 5' 2" (1.575 m)   Wt 50 kg (110 lb 3.7 oz)   SpO2 96%   BMI 20.16 kg/m²     "

## 2025-02-28 NOTE — PLAN OF CARE
Problem: Adult Inpatient Plan of Care  Goal: Plan of Care Review  Outcome: Progressing    Vital signs, labs, progress notes and care plan reviewed.

## 2025-02-28 NOTE — NURSING
VIRTUAL NURSE: Pt arrived to unit. Permission received to turn camera to view patient. VIP model explained; Patient informed this VN will be working with bedside nurse and the rest of the care team.      Admission questions completed to best of patient's ability and medical records from transferring facility. Plan of care reviewed with patient.  Educated patient on VTE and fall risk. Safety precautions in place. Call light within reach, side rails up x2.     Patient instucted to ask staff for assistance. Patient verbalized complete understanding,   Will continue to be available and intervene as needed.    Labs, notes, orders, and careplan reviewed.    02/28/25 0340   Admission   Initial VN Admission Questions Complete   Shift   Virtual Nurse - Rounding Complete   Virtual Nurse - Patient Verbalized Approval Of Camera Use;VN Rounding   Type of Frequent Check   Type Patient Rounds   Safety/Activity   Patient Rounds bed in low position;bed wheels locked;call light in patient/parent reach;ID band on;visualized patient   Safety Promotion/Fall Prevention assistive device/personal item within reach   Safety Precautions emergency equipment at bedside   Activity Assistance Provided assistance, 1 person   Positioning   Body Position position changed independently;other (see comments)  (needs assist from staff, encourage to shif weight frequently)   Head of Bed (HOB) Positioning HOB elevated

## 2025-02-28 NOTE — ASSESSMENT & PLAN NOTE
Patient's blood pressure range in the last 24 hours was: BP  Min: 117/63  Max: 160/84.The patient's inpatient anti-hypertensive regimen is listed below:  Current Antihypertensives  lisinopriL tablet 20 mg, Daily, Oral  metoprolol succinate (TOPROL-XL) 24 hr tablet 25 mg, Daily, Oral    Plan  - BP is controlled, no changes needed to their regimen  - monitor blood pressure closely.

## 2025-02-28 NOTE — ED NOTES
Attempted to call report, number given is for [facility], called [phone], on hold for 8 minutes, unable to give report, will try back

## 2025-02-28 NOTE — ASSESSMENT & PLAN NOTE
Admit to medical floor with telemetry.    Inpatient gastroenterology consultation.  2/28 s/p AUS with fine-needle biopsy of pancreatic mass; s/p ERCP with biliary stricture and mass causing obstruction treated with stent.     2/28 EUS:   Impression:            - Normal esophagus.                          - Normal stomach.                          - Normal examined duodenum.                          - A mass was identified in the pancreatic head.                          This was staged T3 N0 Mx by endosonographic                          criteria. The staging applies if malignancy is                          confirmed. Fine needle biopsy performed.   Recommendation:        - Discharge patient to home.                          - Resume previous diet.                          - Continue present medications.                          - Await path results.                          - Perform an ERCP today.     2/28 ERCP:  Impression:            - A single localized biliary stricture was found                          in the lower third of the main bile duct. The                          stricture was malignant appearing.                          - The upper third of the main bile duct and middle                          third of the main bile duct were dilated, with a                          mass causing an obstruction.                          - One uncovered metal stent was placed into the                          common bile duct.   Recommendation:        - [facility] to [facility] for [facility] care.                          - Resume previous diet.                          - Continue present medications.

## 2025-02-28 NOTE — CONSULTS
[facility] Surg  Gastroenterology  Consult Note    Patient Name: Megan Godoy  MRN: [id number]  Admission Date: 2/28/2025  Hospital Length of Stay: 0 days  Code Status: Full Code   Attending Provider: Saeid Montano,*   Consulting Provider: Joey Avila MD  Primary Care Physician: No, Primary Doctor  Principal Problem:Biliary obstruction    Gastroenterology  Consult performed by: Joey Avila MD  Consult ordered by: Tristan Herrera MD        Subjective:     HPI:  89 y.o. female with past medical history of CHF, AFib, dementia, anemia and hypertension. Patient presents to [facility] -[facility] with abnormal labs. Per EMS, nursing home reported patient having yellowing of skin. In ED patient had CT scan of abdomen that showed distended gallbladder containing stones and sludge, intra and extrahepatic biliary dilation and mild pancreatic ductal dilation. Patient transferred to [facility] for higher level of care. Patient is a poor historian and most of the history is obtained from previous medical records.     Today reports ongoing abd pain.     No past medical history on file.    No past surgical history on file.    Review of patient's allergies indicates:  No Known Allergies  Family History    None       Tobacco Use    Smoking status: Unknown    Smokeless tobacco: Not on file   Substance and Sexual Activity    Alcohol use: Never    Drug use: Not on file    Sexual activity: Not on file     Review of Systems   Gastrointestinal:  Positive for abdominal pain.   Musculoskeletal:  Positive for arthralgias.     Objective:     Vital Signs (Most Recent):  Temp: 98.7 °F (37.1 °C) (02/28/25 1113)  Pulse: 77 (02/28/25 1113)  Resp: 18 (02/28/25 1113)  BP: 139/88 (02/28/25 1113)  SpO2: 96 % (02/28/25 1113) Vital Signs (24h Range):  Temp:  [98.5 °F (36.9 °C)-99 °F (37.2 °C)] 98.7 °F (37.1 °C)  Pulse:  [75-96] 77  Resp:  [18-29] 18  SpO2:  [95 %-100 %] 96 %  BP: (134-160)/(68-96) 139/88      Weight: 50 kg (110 lb 3.7 oz) (02/28/25 0309)  Body mass index is 20.16 kg/m².    No intake or output data in the 24 hours ending 02/28/25 1236    Lines/Drains/Airways       Peripheral Intravenous Line  Duration                  Peripheral IV - Single Lumen 02/27/25 1815 20 G 1 in No Left;Posterior Forearm <1 day                     Physical Exam  Constitutional:       Appearance: She is well-developed.   HENT:      Head: Normocephalic and atraumatic.   Eyes:      Conjunctiva/sclera: Conjunctivae normal.   Pulmonary:      Effort: Pulmonary effort is normal. No respiratory distress.   Musculoskeletal:      Cervical back: Normal range of motion.   Neurological:      Mental Status: She is alert and oriented to person, place, and time.   Psychiatric:         Behavior: Behavior normal.         Thought Content: Thought content normal.         Judgment: Judgment normal.          Significant Labs:  Recent Lab Results  (Last 5 results in the past 24 hours)        02/28/25  0550   02/28/25  0356   02/27/25  1922   02/27/25  1915   02/27/25  1758        Allens Test         N/A       Anion Gap   11             Appearance, UA       Hazy         Baso #   0.02             Basophil %   0.2             Bilirubin (UA)       3+  Comment: Positive urine bilirubin is not confirmed. Correlate with   serum bilirubin and clinical presentation.           Site         Other       BUN   2             Calcium   9.0             Chloride   105             CO2   23             Color, UA       Yellow         Creatinine   0.6             DelSys         Room Air       Differential Method   Automated             eGFR   >60             Eos #   0.1             Eos %   1.1             Glucose   103             Glucose, UA       Negative         Gran # (ANC)   5.8             Gran %   69.1             Hematocrit   31.1             Hemoglobin   10.8             Hyaline Casts, UA       1         Immature Grans (Abs)   0.04  Comment: Mild elevation in  immature granulocytes is non specific and   can be seen in a variety of conditions including stress response,   acute inflammation, trauma and pregnancy. Correlation with other   laboratory and clinical findings is essential.               Immature Granulocytes   0.5             INR 1.5  Comment: Coumadin Therapy:  2.0 - 3.0 for INR for all indicators except mechanical heart valves  and antiphospholipid syndromes which should use 2.5 - 3.5.  LOT^040^PT Inn^[id number]                 Ketones, UA       Negative         Lactic Acid Level     1.2  Comment: Falsely low lactic acid results can be found in samples   containing >=13.0 mg/dL total bilirubin and/or >=3.5 mg/dL   direct bilirubin.             Leukocyte Esterase, UA       3+         Lymph #   1.7             Lymph %   19.6             MCH   29.3             MCHC   34.7             MCV   84             Microscopic Comment       SEE COMMENT  Comment: Other formed elements not mentioned in the report are not   present in the microscopic examination.            Mode         SPONT       Mono #   0.8             Mono %   9.5             MPV   11.4             NITRITE UA       Negative         nRBC   0             Blood, UA       TRACE         pH, UA       6.0         Platelet Count   316             POC BE         6       POC HCO3         30.8       POC PCO2         49.7       POC PH         7.401       POC PO2         29       POC SATURATED O2         54       POC TCO2         32       Potassium   3.2             Protein, UA       Trace  Comment: Recommend a 24 hour urine protein or a urine   protein/creatinine ratio if globulin induced proteinuria is  clinically suspected.           PT 16.5               RBC   3.69             RBC, UA       2         RDW   22.2             Sample         VENOUS       Sodium   139             Sp02         100       Spec Grav UA       1.020         Specimen UA       Urine, Clean Catch         Squam Epithel, UA       15          Unclassified Crystals       3         UROBILINOGEN UA       Negative         WBC, UA       100         WBC   8.40                                    Significant Imaging:  Imaging results within the past 24 hours have been reviewed.  Assessment/Plan:     GI  * Biliary obstruction  With elevated Bili. Questionable pancreatic lesion noted on CT.  Plan for EUS +/- ERCP today  NPO        Thank you for your consult. I will follow-up with patient. Please contact us if you have any additional questions.    Joey Avila MD  Gastroenterology  [facility] Surg

## 2025-02-28 NOTE — ASSESSMENT & PLAN NOTE
Patient with dementia with likely etiology of unknown dementia. Dementia is severe. The patient does not have signs of behavioral disturbance. Home dementia medications are Held or Continued: continued.. Continue non-pharmacologic interventions to prevent delirium (No VS between 11PM-5AM, activity during day, opening blinds, providing glasses/hearing aids, and up in chair during daytime). Will avoid narcotics and benzos unless absolutely necessary. PRN anti-psychotics are not prescribed to avoid self harm behaviors.

## 2025-02-28 NOTE — ASSESSMENT & PLAN NOTE
Patient's FSGs are controlled on current medication regimen.  Last A1c reviewed-   Lab Results   Component Value Date    HGBA1C 5.8 10/20/2024     Most recent fingerstick glucose reviewed-   Recent Labs   Lab 02/27/25  1734   POCTGLUCOSE 151*     Current correctional scale  Low  Maintain anti-hyperglycemic dose as follows-   Antihyperglycemics (From admission, onward)      Start     Stop Route Frequency Ordered    02/28/25 1810  insulin aspart U-100 pen 0-5 Units         -- SubQ Before meals & nightly PRN 02/28/25 1711          Hold Oral hypoglycemics while patient is in the hospital.

## 2025-03-01 LAB
ALBUMIN SERPL BCP-MCNC: 2.4 G/DL (ref 3.5–5.2)
ALP SERPL-CCNC: 519 U/L (ref 40–150)
ALT SERPL W/O P-5'-P-CCNC: 72 U/L (ref 10–44)
ANION GAP SERPL CALC-SCNC: 11 MMOL/L (ref 8–16)
AST SERPL-CCNC: 102 U/L (ref 10–40)
BASOPHILS # BLD AUTO: 0.02 K/UL (ref 0–0.2)
BASOPHILS NFR BLD: 0.3 % (ref 0–1.9)
BILIRUB DIRECT SERPL-MCNC: 10.2 MG/DL (ref 0.1–0.3)
BILIRUB SERPL-MCNC: 13.5 MG/DL (ref 0.1–1)
BUN SERPL-MCNC: 11 MG/DL (ref 8–23)
CALCIUM SERPL-MCNC: 9 MG/DL (ref 8.7–10.5)
CHLORIDE SERPL-SCNC: 105 MMOL/L (ref 95–110)
CO2 SERPL-SCNC: 25 MMOL/L (ref 23–29)
CREAT SERPL-MCNC: 0.6 MG/DL (ref 0.5–1.4)
DIFFERENTIAL METHOD BLD: ABNORMAL
EOSINOPHIL # BLD AUTO: 0.1 K/UL (ref 0–0.5)
EOSINOPHIL NFR BLD: 1.3 % (ref 0–8)
ERYTHROCYTE [DISTWIDTH] IN BLOOD BY AUTOMATED COUNT: 21.9 % (ref 11.5–14.5)
EST. GFR  (NO RACE VARIABLE): >60 ML/MIN/1.73 M^2
GLUCOSE SERPL-MCNC: 109 MG/DL (ref 70–110)
HCT VFR BLD AUTO: 28.1 % (ref 37–48.5)
HGB BLD-MCNC: 9.8 G/DL (ref 12–16)
IMM GRANULOCYTES # BLD AUTO: 0.03 K/UL (ref 0–0.04)
IMM GRANULOCYTES NFR BLD AUTO: 0.4 % (ref 0–0.5)
LYMPHOCYTES # BLD AUTO: 2 K/UL (ref 1–4.8)
LYMPHOCYTES NFR BLD: 28.2 % (ref 18–48)
MCH RBC QN AUTO: 29.3 PG (ref 27–31)
MCHC RBC AUTO-ENTMCNC: 34.9 G/DL (ref 32–36)
MCV RBC AUTO: 84 FL (ref 82–98)
MONOCYTES # BLD AUTO: 0.8 K/UL (ref 0.3–1)
MONOCYTES NFR BLD: 10.8 % (ref 4–15)
NEUTROPHILS # BLD AUTO: 4.1 K/UL (ref 1.8–7.7)
NEUTROPHILS NFR BLD: 59 % (ref 38–73)
NRBC BLD-RTO: 0 /100 WBC
OHS QRS DURATION: 70 MS
OHS QTC CALCULATION: 453 MS
PLATELET # BLD AUTO: 316 K/UL (ref 150–450)
PMV BLD AUTO: 11.9 FL (ref 9.2–12.9)
POCT GLUCOSE: 112 MG/DL (ref 70–110)
POCT GLUCOSE: 116 MG/DL (ref 70–110)
POCT GLUCOSE: 142 MG/DL (ref 70–110)
POCT GLUCOSE: 171 MG/DL (ref 70–110)
POTASSIUM SERPL-SCNC: 3.4 MMOL/L (ref 3.5–5.1)
PROT SERPL-MCNC: 6.5 G/DL (ref 6–8.4)
RBC # BLD AUTO: 3.34 M/UL (ref 4–5.4)
SODIUM SERPL-SCNC: 141 MMOL/L (ref 136–145)
WBC # BLD AUTO: 6.96 K/UL (ref 3.9–12.7)

## 2025-03-01 PROCEDURE — 36415 COLL VENOUS BLD VENIPUNCTURE: CPT | Performed by: FAMILY MEDICINE

## 2025-03-01 PROCEDURE — 25000003 PHARM REV CODE 250

## 2025-03-01 PROCEDURE — 80076 HEPATIC FUNCTION PANEL: CPT | Performed by: FAMILY MEDICINE

## 2025-03-01 PROCEDURE — 99232 SBSQ HOSP IP/OBS MODERATE 35: CPT | Mod: ,,, | Performed by: INTERNAL MEDICINE

## 2025-03-01 PROCEDURE — 97530 THERAPEUTIC ACTIVITIES: CPT

## 2025-03-01 PROCEDURE — 97161 PT EVAL LOW COMPLEX 20 MIN: CPT

## 2025-03-01 PROCEDURE — 25000003 PHARM REV CODE 250: Performed by: FAMILY MEDICINE

## 2025-03-01 PROCEDURE — 99900035 HC TECH TIME PER 15 MIN (STAT)

## 2025-03-01 PROCEDURE — 94761 N-INVAS EAR/PLS OXIMETRY MLT: CPT

## 2025-03-01 PROCEDURE — 80048 BASIC METABOLIC PNL TOTAL CA: CPT | Performed by: FAMILY MEDICINE

## 2025-03-01 PROCEDURE — 51798 US URINE CAPACITY MEASURE: CPT

## 2025-03-01 PROCEDURE — 11000001 HC ACUTE MED/SURG PRIVATE ROOM

## 2025-03-01 PROCEDURE — 85025 COMPLETE CBC W/AUTO DIFF WBC: CPT | Performed by: FAMILY MEDICINE

## 2025-03-01 PROCEDURE — 51701 INSERT BLADDER CATHETER: CPT

## 2025-03-01 PROCEDURE — 63600175 PHARM REV CODE 636 W HCPCS

## 2025-03-01 RX ORDER — FUROSEMIDE 10 MG/ML
40 INJECTION INTRAMUSCULAR; INTRAVENOUS ONCE
Status: COMPLETED | OUTPATIENT
Start: 2025-03-01 | End: 2025-03-01

## 2025-03-01 RX ORDER — POLYETHYLENE GLYCOL 3350 17 G/17G
17 POWDER, FOR SOLUTION ORAL DAILY
Status: DISCONTINUED | OUTPATIENT
Start: 2025-03-01 | End: 2025-03-03 | Stop reason: HOSPADM

## 2025-03-01 RX ORDER — LANOLIN ALCOHOL/MO/W.PET/CERES
400 CREAM (GRAM) TOPICAL ONCE
Status: COMPLETED | OUTPATIENT
Start: 2025-03-01 | End: 2025-03-01

## 2025-03-01 RX ORDER — SYRING-NEEDL,DISP,INSUL,0.3 ML 29 G X1/2"
296 SYRINGE, EMPTY DISPOSABLE MISCELLANEOUS ONCE
Status: DISCONTINUED | OUTPATIENT
Start: 2025-03-01 | End: 2025-03-01

## 2025-03-01 RX ORDER — LIDOCAINE 50 MG/G
1 PATCH TOPICAL
Status: DISCONTINUED | OUTPATIENT
Start: 2025-03-01 | End: 2025-03-03 | Stop reason: HOSPADM

## 2025-03-01 RX ORDER — BISACODYL 10 MG/1
10 SUPPOSITORY RECTAL DAILY PRN
Status: DISCONTINUED | OUTPATIENT
Start: 2025-03-01 | End: 2025-03-03 | Stop reason: HOSPADM

## 2025-03-01 RX ORDER — AMOXICILLIN 250 MG
1 CAPSULE ORAL DAILY
Status: DISCONTINUED | OUTPATIENT
Start: 2025-03-02 | End: 2025-03-01

## 2025-03-01 RX ORDER — KETOROLAC TROMETHAMINE 30 MG/ML
15 INJECTION, SOLUTION INTRAMUSCULAR; INTRAVENOUS EVERY 6 HOURS PRN
Status: DISCONTINUED | OUTPATIENT
Start: 2025-03-01 | End: 2025-03-03 | Stop reason: HOSPADM

## 2025-03-01 RX ADMIN — POLYETHYLENE GLYCOL (3350) 17 G: 17 POWDER, FOR SOLUTION ORAL at 08:03

## 2025-03-01 RX ADMIN — ATORVASTATIN CALCIUM 10 MG: 10 TABLET, FILM COATED ORAL at 08:03

## 2025-03-01 RX ADMIN — PREGABALIN 50 MG: 50 CAPSULE ORAL at 02:03

## 2025-03-01 RX ADMIN — RIVAROXABAN 15 MG: 15 TABLET, FILM COATED ORAL at 09:03

## 2025-03-01 RX ADMIN — LIDOCAINE 1 PATCH: 50 PATCH CUTANEOUS at 08:03

## 2025-03-01 RX ADMIN — FUROSEMIDE 40 MG: 10 INJECTION, SOLUTION INTRAVENOUS at 08:03

## 2025-03-01 RX ADMIN — LISINOPRIL 20 MG: 20 TABLET ORAL at 08:03

## 2025-03-01 RX ADMIN — PANTOPRAZOLE SODIUM 40 MG: 40 TABLET, DELAYED RELEASE ORAL at 08:03

## 2025-03-01 RX ADMIN — KETOROLAC TROMETHAMINE 15 MG: 30 INJECTION, SOLUTION INTRAMUSCULAR; INTRAVENOUS at 09:03

## 2025-03-01 RX ADMIN — PREGABALIN 50 MG: 50 CAPSULE ORAL at 08:03

## 2025-03-01 RX ADMIN — MAGNESIUM OXIDE TAB 400 MG (241.3 MG ELEMENTAL MG) 400 MG: 400 (241.3 MG) TAB at 09:03

## 2025-03-01 RX ADMIN — METOPROLOL SUCCINATE 25 MG: 25 TABLET, EXTENDED RELEASE ORAL at 08:03

## 2025-03-01 RX ADMIN — POTASSIUM BICARBONATE 20 MEQ: 391 TABLET, EFFERVESCENT ORAL at 09:03

## 2025-03-01 RX ADMIN — SERTRALINE HYDROCHLORIDE 25 MG: 25 TABLET ORAL at 08:03

## 2025-03-01 NOTE — SUBJECTIVE & OBJECTIVE
Interval History:  Nursing notes reviewed; MACARIO.    Patient seen on AM rounds and found in no acute distress; No new complaints noted.   Afebrile; VSS.  LFTs with improvement.  s/p AUS with fine-needle biopsy of pancreatic mass; s/p ERCP with biliary stricture and mass causing obstruction treated with stent.    We will monitor overnight with plan to transfer back to snf nursing in a.m.      Review of Systems   Unable to perform ROS: Dementia     Objective:     Vital Signs (Most Recent):  Temp: 97.5 °F (36.4 °C) (03/01/25 1122)  Pulse: 81 (03/01/25 1122)  Resp: 18 (03/01/25 1122)  BP: 117/70 (03/01/25 1122)  SpO2: 99 % (03/01/25 1122) Vital Signs (24h Range):  Temp:  [97.5 °F (36.4 °C)-99 °F (37.2 °C)] 97.5 °F (36.4 °C)  Pulse:  [72-83] 81  Resp:  [18-20] 18  SpO2:  [95 %-99 %] 99 %  BP: (112-134)/(60-76) 117/70     Weight: 50 kg (110 lb 3.7 oz)  Body mass index is 20.16 kg/m².    Intake/Output Summary (Last 24 hours) at 3/1/2025 1610  Last data filed at 2/28/2025 2007  Gross per 24 hour   Intake 363 ml   Output 200 ml   Net 163 ml         Physical Exam  Vitals and nursing note reviewed.   HENT:      Head: Normocephalic and atraumatic.      Nose: Nose normal.      Mouth/Throat:      Mouth: Mucous membranes are moist.   Eyes:      Pupils: Pupils are equal, round, and reactive to light.   Cardiovascular:      Rate and Rhythm: Regular rhythm.   Pulmonary:      Breath sounds: Normal breath sounds.   Abdominal:      Tenderness: There is abdominal tenderness (epigastric).   Musculoskeletal:         General: Normal range of motion.      Cervical back: Neck supple.   Skin:     General: Skin is warm.   Neurological:      Mental Status: She is alert. She is disoriented.   Psychiatric:         Mood and Affect: Mood normal.             Significant Labs: All pertinent labs within the past 24 hours have been reviewed.    Significant Imaging: I have reviewed all pertinent imaging results/findings within the past 24 hours.

## 2025-03-01 NOTE — PROGRESS NOTES
[facility] Medicine  Progress Note    Patient Name: Megan Godoy  MRN: [id number]  Patient Class: IP- Inpatient   Admission Date: 2/28/2025  Length of Stay: 1 days  Attending Physician: Saeid Montano,*  Primary Care Provider: Yamileth, Primary Doctor        Subjective     Principal Problem:Biliary obstruction        HPI:  Megan Godoy is a 89 y.o. female with past medical history of CHF, AFib, dementia, anemia and hypertension.  [facility].  Per EMS, nursing home reported patient having yellowing of skin.  In ED patient had CT scan of abdomen that showed distended gallbladder containing stones and sludge, intra and extrahepatic biliary dilation and mild pancreatic ductal dilation.  Patient transferred to [facility] for higher level of care.  Patient is a poor historian and most of the history is obtained from previous medical records.    Overview/Hospital Course:  No notes on file    Interval History:  Nursing notes reviewed; NAEON.    Patient seen on AM rounds and found in no acute distress; No new complaints noted.   Afebrile; VSS.  LFTs with improvement.  s/p AUS with fine-needle biopsy of pancreatic mass; s/p ERCP with biliary stricture and mass causing obstruction treated with stent.    We will monitor overnight with plan to transfer back to [facility] nursing in a.m.      Review of Systems   Unable to perform ROS: Dementia     Objective:     Vital Signs (Most Recent):  Temp: 97.5 °F (36.4 °C) (03/01/25 1122)  Pulse: 81 (03/01/25 1122)  Resp: 18 (03/01/25 1122)  BP: 117/70 (03/01/25 1122)  SpO2: 99 % (03/01/25 1122) Vital Signs (24h Range):  Temp:  [97.5 °F (36.4 °C)-99 °F (37.2 °C)] 97.5 °F (36.4 °C)  Pulse:  [72-83] 81  Resp:  [18-20] 18  SpO2:  [95 %-99 %] 99 %  BP: (112-134)/(60-76) 117/70     Weight: 50 kg (110 lb 3.7 oz)  Body mass index is 20.16 kg/m².    Intake/Output Summary (Last 24 hours) at 3/1/2025 1610  Last  data filed at 2/28/2025 2007  Gross per 24 hour   Intake 363 ml   Output 200 ml   Net 163 ml         Physical Exam  Vitals and nursing note reviewed.   HENT:      Head: Normocephalic and atraumatic.      Nose: Nose normal.      Mouth/Throat:      Mouth: Mucous membranes are moist.   Eyes:      Pupils: Pupils are equal, round, and reactive to light.   Cardiovascular:      Rate and Rhythm: Regular rhythm.   Pulmonary:      Breath sounds: Normal breath sounds.   Abdominal:      Tenderness: There is abdominal tenderness (epigastric).   Musculoskeletal:         General: Normal range of motion.      Cervical back: Neck supple.   Skin:     General: Skin is warm.   Neurological:      Mental Status: She is alert. She is disoriented.   Psychiatric:         Mood and Affect: Mood normal.             Significant Labs: All pertinent labs within the past 24 hours have been reviewed.    Significant Imaging: I have reviewed all pertinent imaging results/findings within the past 24 hours.    Assessment and Plan     * Biliary obstruction  Admit to medical floor with telemetry.    Inpatient gastroenterology consultation.  2/28 s/p AUS with fine-needle biopsy of pancreatic mass; s/p ERCP with biliary stricture and mass causing obstruction treated with stent.   Patient doing well; LFTs downtrending; abdominal pain greatly improved; tolerating diet.    2/28 EUS:   Impression:            - Normal esophagus.                          - Normal stomach.                          - Normal examined duodenum.                          - A mass was identified in the pancreatic head.                          This was staged T3 N0 Mx by endosonographic                          criteria. The staging applies if malignancy is                          confirmed. Fine needle biopsy performed.   Recommendation:        - Discharge patient to home.                          - Resume previous diet.                          - Continue present medications.                           - Await path results.                          - Perform an ERCP today.     2/28 ERCP:  Impression:            - A single localized biliary stricture was found                          in the lower third of the main bile duct. The                          stricture was malignant appearing.                          - The upper third of the main bile duct and middle                          third of the main bile duct were dilated, with a                          mass causing an obstruction.                          - One uncovered metal stent was placed into the                          common bile duct.   Recommendation:        - [facility].                          - Resume previous diet.                          - Continue present medications.       A-fib  Patient has paroxysmal (<7 days) atrial fibrillation. Patient is currently in sinus rhythm. [id number] Score: 4. The patients heart rate in the last 24 hours is as follows:  Pulse  Min: 72  Max: 83     Antiarrhythmics  metoprolol succinate (TOPROL-XL) 24 hr tablet 25 mg, Daily, Oral    Anticoagulants  rivaroxaban tablet 15 mg, Daily, Oral    Plan  - Replete lytes with a goal of K>4, Mg >2  - Patient is anticoagulated, see medications listed above.  - Patient's afib is currently controlled  - restart anticoagulation         HTN (hypertension)  Patient's blood pressure range in the last 24 hours was: BP  Min: 117/63  Max: 160/84.The patient's inpatient anti-hypertensive regimen is listed below:  Current Antihypertensives  lisinopriL tablet 20 mg, Daily, Oral  metoprolol succinate (TOPROL-XL) 24 hr tablet 25 mg, Daily, Oral    Plan  - BP is controlled, no changes needed to their regimen  - monitor blood pressure closely.    (HFpEF) heart failure with preserved ejection fraction  Strict I's and O's.    Gentle diuresis with Lasix p.r.n..          Type 2 diabetes mellitus  Patient's FSGs are controlled on current  medication regimen.  Last A1c reviewed-   Lab Results   Component Value Date    HGBA1C 4.7 02/28/2025     Most recent fingerstick glucose reviewed-   Recent Labs   Lab 02/28/25  1736   POCTGLUCOSE 102       Current correctional scale  Low  Maintain anti-hyperglycemic dose as follows-   Antihyperglycemics (From admission, onward)      Start     Stop Route Frequency Ordered    02/28/25 1810  insulin aspart U-100 pen 0-5 Units         -- SubQ Before meals & nightly PRN 02/28/25 1711          Hold Oral hypoglycemics while patient is in the hospital.    Dementia  Patient with dementia with likely etiology of unknown dementia. Dementia is severe. The patient does not have signs of behavioral disturbance. Home dementia medications are Held or Continued: continued.. Continue non-pharmacologic interventions to prevent delirium (No VS between 11PM-5AM, activity during day, opening blinds, providing glasses/hearing aids, and up in chair during daytime). Will avoid narcotics and benzos unless absolutely necessary. PRN anti-psychotics are not prescribed to avoid self harm behaviors.      VTE Risk Mitigation (From admission, onward)           Ordered     rivaroxaban tablet 15 mg  Daily         03/01/25 0835     IP VTE HIGH RISK PATIENT  Once         02/28/25 0335     Place sequential compression device  Until discontinued         02/28/25 0335                    Discharge Planning   MAGY:      Code Status: Full Code   Medical Readiness for Discharge Date:   Discharge Plan A: Return to nursing home                Please place Justification for DME        Jose Hilton PA-C  Department of Hospital Medicine   [facility] S[facility]

## 2025-03-01 NOTE — PT/OT/SLP EVAL
Physical Therapy Evaluation    Patient Name:  Megan Godoy   MRN:  [id number]    Recommendations:     Discharge Recommendations: Moderate Intensity Therapy   Discharge Equipment Recommendations: other (see comments) (TBD pending progress)   Barriers to discharge: None    Assessment:     Megan Godoy is a 89 y.o. female admitted with a medical diagnosis of Biliary obstruction.  She presents with the following impairments/functional limitations: weakness, impaired self care skills, impaired functional mobility, gait instability, impaired balance, impaired cognition, decreased lower extremity function, decreased safety awareness, pain, impaired coordination, impaired fine motor, impaired muscle length, impaired joint extensibility. Pt required Mod A to transfer<>supine to sit.  She was able to sit at EOB for 7 minutes with SBA/CGA.  She attempted to transfer sit to stand, but was unable to do so with Max A of one with a RW.  She will require assist of 2 to attempt to stand safely.    Rec: Moderate Intensity Therapy  DME: TBD pending progress..    Rehab Prognosis: Fair; patient would benefit from acute skilled PT services to address these deficits and reach maximum level of function.    Recent Surgery: Procedure(s) (LRB):  ULTRASOUND, UPPER GI TRACT, ENDOSCOPIC (N/A)  ERCP (ENDOSCOPIC RETROGRADE CHOLANGIOPANCREATOGRAPHY) (N/A) 1 Day Post-Op    Plan:     During this hospitalization, patient to be seen 4 x/week to address the identified rehab impairments via   and progress toward the following goals:    Plan of Care Expires:  03/28/25    Subjective     Chief Complaint: R leg pain  Patient/Family Comments/goals: Pt agreeable to Tx.  Pain/Comfort:  Pain Rating 1: 5/10  Location - Side 1: Right  Location 1: leg  Pain Addressed 1: Reposition, Distraction, Cessation of Activity, Nurse notified  Pain Rating Post-Intervention 1: 5/10    Patients cultural, spiritual, Voodoo conflicts given the current situation:  no    Living Environment:  Chart indicates that Pt is from a nursing home.  Prior to admission, patients level of function was In need of assistance - Pt is a poor historian.  Equipment used at home: other (see comments) (Unknown - Pt is a poor historian).  DME owned (not currently used):  unknown .  Upon discharge, patient will have assistance from Nursing home staff.    Objective:     Communicated with Nurse prior to session.  Patient found left sidelying with bed alarm, peripheral IV  upon PT entry to room.    General Precautions: Standard, diabetic, fall  Orthopedic Precautions:N/A   Braces: N/A  Respiratory Status: Room air    Exams:  Cognitive Exam:  Patient is oriented to Person  Sensation:    -       Intact  Pt reports taht she is able to feel me touching her LE's  RLE ROM: Deficits: Hip flexion 75 degrees, knee flexion 90 degrees  RLE Strength: Deficits: 2/5  LLE ROM: WFL  LLE Strength: Deficits: 2/5    Functional Mobility:  Bed Mobility:     Rolling Left:  moderate assistance  Rolling Right: moderate assistance  Supine to Sit: moderate assistance  Sit to Supine: moderate assistance  Transfers:     Sit to Stand:  Pt was not able to transfer sit to stand with Max A of one with rolling walker      AM-PAC 6 CLICK MOBILITY  Total Score:10       Treatment & Education:  Role of Physical Therapy - needs reinforcement   Plan of Care - needs reinforcement   Bed mobility training   Use of nurse call button   Pt to not attempt to get out of bed without assist    Patient left right sidelying with all lines intact, call button in reach, bed alarm on, and Nurse notified.    GOALS:   Multidisciplinary Problems       Physical Therapy Goals          Problem: Physical Therapy    Goal Priority Disciplines Outcome Interventions   Physical Therapy Goal     PT, PT/OT Progressing    Description: Goals to be met by: 3/28/2025    Patient will increase functional independence with mobility by performin. Sit<>stand with Max A  with RW.  2. Pivot transfer bed to chair with Max A.  3. Supine<>sit with Min A.                             DME Justifications:  No DME recommended requiring DME justifications    History:     History reviewed. No pertinent past medical history.    Past Surgical History:   Procedure Laterality Date    ENDOSCOPIC ULTRASOUND OF UPPER GASTROINTESTINAL TRACT N/A 2/28/2025    Procedure: ULTRASOUND, UPPER GI TRACT, ENDOSCOPIC;  Surgeon: Goyo Mai MD;  Location: [facility] ENDO;  Service: Endoscopy;  Laterality: N/A;    ERCP N/A 2/28/2025    Procedure: ERCP (ENDOSCOPIC RETROGRADE CHOLANGIOPANCREATOGRAPHY);  Surgeon: Goyo Mai MD;  Location: [facility];  Service: Endoscopy;  Laterality: N/A;       Time Tracking:     PT Received On: 03/01/25  PT Start Time: 1047     PT Stop Time: 1119  PT Total Time (min): 32 min     Billable Minutes: Evaluation 15 and Therapeutic Activity 17      03/01/2025

## 2025-03-01 NOTE — PLAN OF CARE
Pt disoriented to time, place, and situation. Medications administered per order. Juandice skin and yellow sclera. BG monitored. Z flex boots maintained. Encouraged to call with questions/concerns/assistance. Safety.

## 2025-03-01 NOTE — PLAN OF CARE
Problem: Adult Inpatient Plan of Care  Goal: Plan of Care Review  Outcome: Progressing     Problem: UTI (Urinary Tract Infection)  Goal: Improved Infection Symptoms  Outcome: Progressing     Problem: Fall Injury Risk  Goal: Absence of Fall and Fall-Related Injury  Outcome: Progressing     Problem: Diabetes  Goal: Optimal Coping  Outcome: Progressing     Problem: Comorbidity Management  Goal: Maintenance of Behavioral Health Symptom Control  Outcome: Progressing     Problem: Skin Injury Risk Increased  Goal: Skin Health and Integrity  Outcome: Progressing     Problem: Wound  Goal: Optimal Coping  Outcome: Progressing     Problem: Diabetes Comorbidity  Goal: Blood Glucose Level Within Targeted Range  Outcome: Progressing    Oriented to self only. Meds given per MAR. Pt on diaper. BG checks. Meds given per MAR. Pt has decreased appetite, tray setup & assist w/ feed done this shift.  On waffle bed & zflex boots. Bed in lowest position. Call light within reach.

## 2025-03-01 NOTE — ASSESSMENT & PLAN NOTE
Patient has paroxysmal (<7 days) atrial fibrillation. Patient is currently in sinus rhythm. [id number] Score: 4. The patients heart rate in the last 24 hours is as follows:  Pulse  Min: 72  Max: 83     Antiarrhythmics  metoprolol succinate (TOPROL-XL) 24 hr tablet 25 mg, Daily, Oral    Anticoagulants  rivaroxaban tablet 15 mg, Daily, Oral    Plan  - Replete lytes with a goal of K>4, Mg >2  - Patient is anticoagulated, see medications listed above.  - Patient's afib is currently controlled  - restart anticoagulation

## 2025-03-01 NOTE — ASSESSMENT & PLAN NOTE
Patient's FSGs are controlled on current medication regimen.  Last A1c reviewed-   Lab Results   Component Value Date    HGBA1C 4.7 02/28/2025     Most recent fingerstick glucose reviewed-   Recent Labs   Lab 02/28/25  1736   POCTGLUCOSE 102       Current correctional scale  Low  Maintain anti-hyperglycemic dose as follows-   Antihyperglycemics (From admission, onward)    Start     Stop Route Frequency Ordered    02/28/25 1810  insulin aspart U-100 pen 0-5 Units         -- SubQ Before meals & nightly PRN 02/28/25 1711        Hold Oral hypoglycemics while patient is in the hospital.

## 2025-03-01 NOTE — ASSESSMENT & PLAN NOTE
Admit to medical floor with telemetry.    Inpatient gastroenterology consultation.  2/28 s/p AUS with fine-needle biopsy of pancreatic mass; s/p ERCP with biliary stricture and mass causing obstruction treated with stent.   Patient doing well; LFTs downtrending; abdominal pain greatly improved; tolerating diet.    2/28 EUS:   Impression:            - Normal esophagus.                          - Normal stomach.                          - Normal examined duodenum.                          - A mass was identified in the pancreatic head.                          This was staged T3 N0 Mx by endosonographic                          criteria. The staging applies if malignancy is                          confirmed. Fine needle biopsy performed.   Recommendation:        - Discharge patient to home.                          - Resume previous diet.                          - Continue present medications.                          - Await path results.                          - Perform an ERCP today.     2/28 ERCP:  Impression:            - A single localized biliary stricture was found                          in the lower third of the main bile duct. The                          stricture was malignant appearing.                          - The upper third of the main bile duct and middle                          third of the main bile duct were dilated, with a                          mass causing an obstruction.                          - One uncovered metal stent was placed into the                          common bile duct.   Recommendation:        - [facility].                          - Resume previous diet.                          - Continue present medications.

## 2025-03-01 NOTE — PLAN OF CARE
Problem: Physical Therapy  Goal: Physical Therapy Goal  Description: Goals to be met by: 3/28/2025    Patient will increase functional independence with mobility by performin. Sit<>stand with Max A with RW.  2. Pivot transfer bed to chair with Max A.  3. Supine<>sit with Min A.        Outcome: Progressing   Orders received and Physical Therapy evaluation completed.    Pt required Mod A to transfer<>supine to sit.  She was able to sit at EOB for 7 minutes with SBA/CGA.  She attempted to transfer sit to stand, but was unable to do so with Max A of one with a RW.  She will require assist of 2 to attempt to stand safely.    Rec: Moderate Intensity Therapy  DME: TBD pending progress.

## 2025-03-02 LAB
ALBUMIN SERPL BCP-MCNC: 2.4 G/DL (ref 3.5–5.2)
ALP SERPL-CCNC: 495 U/L (ref 40–150)
ALT SERPL W/O P-5'-P-CCNC: 65 U/L (ref 10–44)
ANION GAP SERPL CALC-SCNC: 12 MMOL/L (ref 8–16)
AST SERPL-CCNC: 79 U/L (ref 10–40)
BASOPHILS # BLD AUTO: 0.02 K/UL (ref 0–0.2)
BASOPHILS NFR BLD: 0.3 % (ref 0–1.9)
BILIRUB SERPL-MCNC: 9.7 MG/DL (ref 0.1–1)
BNP SERPL-MCNC: 163 PG/ML (ref 0–99)
BUN SERPL-MCNC: 13 MG/DL (ref 8–23)
CALCIUM SERPL-MCNC: 8.8 MG/DL (ref 8.7–10.5)
CHLORIDE SERPL-SCNC: 105 MMOL/L (ref 95–110)
CO2 SERPL-SCNC: 24 MMOL/L (ref 23–29)
CREAT SERPL-MCNC: 0.7 MG/DL (ref 0.5–1.4)
DIFFERENTIAL METHOD BLD: ABNORMAL
EOSINOPHIL # BLD AUTO: 0.2 K/UL (ref 0–0.5)
EOSINOPHIL NFR BLD: 2.4 % (ref 0–8)
ERYTHROCYTE [DISTWIDTH] IN BLOOD BY AUTOMATED COUNT: 22.5 % (ref 11.5–14.5)
EST. GFR  (NO RACE VARIABLE): >60 ML/MIN/1.73 M^2
GLUCOSE SERPL-MCNC: 100 MG/DL (ref 70–110)
HCT VFR BLD AUTO: 28.9 % (ref 37–48.5)
HGB BLD-MCNC: 10 G/DL (ref 12–16)
IMM GRANULOCYTES # BLD AUTO: 0.04 K/UL (ref 0–0.04)
IMM GRANULOCYTES NFR BLD AUTO: 0.6 % (ref 0–0.5)
LIPASE SERPL-CCNC: <3 U/L (ref 4–60)
LYMPHOCYTES # BLD AUTO: 2.4 K/UL (ref 1–4.8)
LYMPHOCYTES NFR BLD: 34.8 % (ref 18–48)
MCH RBC QN AUTO: 28.8 PG (ref 27–31)
MCHC RBC AUTO-ENTMCNC: 34.6 G/DL (ref 32–36)
MCV RBC AUTO: 83 FL (ref 82–98)
MONOCYTES # BLD AUTO: 0.7 K/UL (ref 0.3–1)
MONOCYTES NFR BLD: 10.1 % (ref 4–15)
NEUTROPHILS # BLD AUTO: 3.6 K/UL (ref 1.8–7.7)
NEUTROPHILS NFR BLD: 51.8 % (ref 38–73)
NRBC BLD-RTO: 0 /100 WBC
PLATELET # BLD AUTO: 296 K/UL (ref 150–450)
PMV BLD AUTO: 11.3 FL (ref 9.2–12.9)
POCT GLUCOSE: 119 MG/DL (ref 70–110)
POCT GLUCOSE: 133 MG/DL (ref 70–110)
POCT GLUCOSE: 149 MG/DL (ref 70–110)
POCT GLUCOSE: 195 MG/DL (ref 70–110)
POTASSIUM SERPL-SCNC: 3.7 MMOL/L (ref 3.5–5.1)
PROT SERPL-MCNC: 6.6 G/DL (ref 6–8.4)
RBC # BLD AUTO: 3.47 M/UL (ref 4–5.4)
SODIUM SERPL-SCNC: 141 MMOL/L (ref 136–145)
WBC # BLD AUTO: 6.96 K/UL (ref 3.9–12.7)

## 2025-03-02 PROCEDURE — 83880 ASSAY OF NATRIURETIC PEPTIDE: CPT | Performed by: HOSPITALIST

## 2025-03-02 PROCEDURE — 25000003 PHARM REV CODE 250

## 2025-03-02 PROCEDURE — 83690 ASSAY OF LIPASE: CPT | Performed by: FAMILY MEDICINE

## 2025-03-02 PROCEDURE — 97165 OT EVAL LOW COMPLEX 30 MIN: CPT

## 2025-03-02 PROCEDURE — 80053 COMPREHEN METABOLIC PANEL: CPT | Performed by: HOSPITALIST

## 2025-03-02 PROCEDURE — 11000001 HC ACUTE MED/SURG PRIVATE ROOM

## 2025-03-02 PROCEDURE — 85025 COMPLETE CBC W/AUTO DIFF WBC: CPT | Performed by: FAMILY MEDICINE

## 2025-03-02 PROCEDURE — 51798 US URINE CAPACITY MEASURE: CPT

## 2025-03-02 PROCEDURE — 51701 INSERT BLADDER CATHETER: CPT

## 2025-03-02 PROCEDURE — 63600175 PHARM REV CODE 636 W HCPCS

## 2025-03-02 PROCEDURE — 25000003 PHARM REV CODE 250: Performed by: FAMILY MEDICINE

## 2025-03-02 PROCEDURE — 36415 COLL VENOUS BLD VENIPUNCTURE: CPT | Performed by: HOSPITALIST

## 2025-03-02 RX ORDER — AMOXICILLIN 250 MG
1 CAPSULE ORAL DAILY
Status: DISCONTINUED | OUTPATIENT
Start: 2025-03-02 | End: 2025-03-02

## 2025-03-02 RX ORDER — FUROSEMIDE 10 MG/ML
40 INJECTION INTRAMUSCULAR; INTRAVENOUS ONCE
Status: COMPLETED | OUTPATIENT
Start: 2025-03-02 | End: 2025-03-02

## 2025-03-02 RX ORDER — BISACODYL 10 MG/1
10 SUPPOSITORY RECTAL DAILY
Status: CANCELLED | OUTPATIENT
Start: 2025-03-03

## 2025-03-02 RX ADMIN — FUROSEMIDE 40 MG: 10 INJECTION, SOLUTION INTRAMUSCULAR; INTRAVENOUS at 06:03

## 2025-03-02 RX ADMIN — ACETAMINOPHEN 650 MG: 325 TABLET ORAL at 08:03

## 2025-03-02 RX ADMIN — ACETAMINOPHEN 650 MG: 325 TABLET ORAL at 06:03

## 2025-03-02 RX ADMIN — ATORVASTATIN CALCIUM 10 MG: 10 TABLET, FILM COATED ORAL at 08:03

## 2025-03-02 RX ADMIN — PREGABALIN 50 MG: 50 CAPSULE ORAL at 09:03

## 2025-03-02 RX ADMIN — PREGABALIN 50 MG: 50 CAPSULE ORAL at 04:03

## 2025-03-02 RX ADMIN — RIVAROXABAN 15 MG: 15 TABLET, FILM COATED ORAL at 11:03

## 2025-03-02 RX ADMIN — SERTRALINE HYDROCHLORIDE 25 MG: 25 TABLET ORAL at 11:03

## 2025-03-02 RX ADMIN — LISINOPRIL 20 MG: 20 TABLET ORAL at 08:03

## 2025-03-02 RX ADMIN — PANTOPRAZOLE SODIUM 40 MG: 40 TABLET, DELAYED RELEASE ORAL at 08:03

## 2025-03-02 RX ADMIN — PREGABALIN 50 MG: 50 CAPSULE ORAL at 08:03

## 2025-03-02 RX ADMIN — LIDOCAINE 1 PATCH: 50 PATCH CUTANEOUS at 06:03

## 2025-03-02 RX ADMIN — METOPROLOL SUCCINATE 25 MG: 25 TABLET, EXTENDED RELEASE ORAL at 08:03

## 2025-03-02 NOTE — ASSESSMENT & PLAN NOTE
Patient's FSGs are controlled on current medication regimen.  Last A1c reviewed-   Lab Results   Component Value Date    HGBA1C 4.7 02/28/2025     Most recent fingerstick glucose reviewed-   Recent Labs   Lab 03/01/25  2124 03/02/25  0629 03/02/25  1107 03/02/25  1627   POCTGLUCOSE 171* 119* 195* 149*       Current correctional scale  Low  Maintain anti-hyperglycemic dose as follows-   Antihyperglycemics (From admission, onward)    Start     Stop Route Frequency Ordered    02/28/25 1810  insulin aspart U-100 pen 0-5 Units         -- SubQ Before meals & nightly PRN 02/28/25 1711        Hold Oral hypoglycemics while patient is in the hospital.

## 2025-03-02 NOTE — ASSESSMENT & PLAN NOTE
Patient has paroxysmal (<7 days) atrial fibrillation. Patient is currently in sinus rhythm. [id number] Score: 4. The patients heart rate in the last 24 hours is as follows:  Pulse  Min: 63  Max: 77     Antiarrhythmics  metoprolol succinate (TOPROL-XL) 24 hr tablet 25 mg, Daily, Oral    Anticoagulants  rivaroxaban tablet 15 mg, Daily, Oral    Plan  - Replete lytes with a goal of K>4, Mg >2  - Patient is anticoagulated, see medications listed above.  - Patient's afib is currently controlled  - restarted anticoagulation

## 2025-03-02 NOTE — SUBJECTIVE & OBJECTIVE
Interval History:  Nursing notes reviewed; MACARIO.    Patient seen on AM rounds and found in no acute distress; reports chronic left knee and chest wall pain.   Afebrile; VSS.  LFTs with continued improvement.  s/p AUS with fine-needle biopsy of pancreatic mass; s/p ERCP with biliary stricture and mass causing obstruction treated with stent.    Pending SNF placement.      Review of Systems   Unable to perform ROS: Dementia     Objective:     Vital Signs (Most Recent):  Temp: 98.3 °F (36.8 °C) (03/02/25 1626)  Pulse: 67 (03/02/25 1626)  Resp: 18 (03/02/25 1626)  BP: 128/66 (03/02/25 1626)  SpO2: 97 % (03/02/25 1626) Vital Signs (24h Range):  Temp:  [97.4 °F (36.3 °C)-98.3 °F (36.8 °C)] 98.3 °F (36.8 °C)  Pulse:  [63-77] 67  Resp:  [14-18] 18  SpO2:  [95 %-97 %] 97 %  BP: (110-140)/(63-86) 128/66     Weight: 50 kg (110 lb 3.7 oz)  Body mass index is 20.16 kg/m².    Intake/Output Summary (Last 24 hours) at 3/2/2025 1744  Last data filed at 3/2/2025 0615  Gross per 24 hour   Intake 250 ml   Output 1150 ml   Net -900 ml         Physical Exam  Vitals and nursing note reviewed.   HENT:      Head: Normocephalic and atraumatic.      Nose: Nose normal.      Mouth/Throat:      Mouth: Mucous membranes are moist.   Eyes:      General: Scleral icterus present.      Pupils: Pupils are equal, round, and reactive to light.   Cardiovascular:      Rate and Rhythm: Normal rate and regular rhythm.      Heart sounds: Normal heart sounds.   Pulmonary:      Effort: Pulmonary effort is normal. No respiratory distress.      Breath sounds: Normal breath sounds.   Abdominal:      General: Abdomen is flat. There is no distension.      Tenderness: There is abdominal tenderness (epigastric).   Musculoskeletal:         General: Tenderness (superficial right knee) present. Normal range of motion.      Cervical back: Neck supple.      Right lower leg: Edema present.      Left lower leg: Edema present.   Skin:     General: Skin is warm.    Neurological:      Mental Status: She is alert. Mental status is at baseline. She is disoriented.   Psychiatric:         Mood and Affect: Mood normal.             Significant Labs: All pertinent labs within the past 24 hours have been reviewed.    Significant Imaging: I have reviewed all pertinent imaging results/findings within the past 24 hours.

## 2025-03-02 NOTE — PT/OT/SLP EVAL
Occupational Therapy   Evaluation and Discharge Note    Name: Megan Godoy  MRN: [id number]  Admitting Diagnosis: Biliary obstruction  Recent Surgery: Procedure(s) (LRB):  ULTRASOUND, UPPER GI TRACT, ENDOSCOPIC (N/A)  ERCP (ENDOSCOPIC RETROGRADE CHOLANGIOPANCREATOGRAPHY) (N/A) 2 Days Post-Op    Recommendations:     Discharge Recommendations: No Therapy Indicated  Discharge Equipment Recommendations: none  Barriers to discharge:  None    Assessment:     Megan Godoy is a 89 y.o. female with a medical diagnosis of Biliary obstruction. Pt was agreeable to OT evaluation but had limited participation.  Pt lives in a NH where she receives assistance with all ADLS.  Pt reports staff use a juliana lift to get her in/out of bed and requires assistance with w/c mobility - pt was non-ambulatory.  Pt completed evaluation and noted to perform at baseline.  Pt will have assistance/supervision as needed once discharged home.  Due to these factors, pt is discharged from OT services.  No DME or post acute OT required at this time.     Plan:     During this hospitalization, patient does not require further acute OT services.  Please re-consult if situation changes.    Plan of Care Reviewed with: patient    Subjective     Chief Complaint: pain in R LE  Patient/Family Comments/goals: return home    Occupational Profile:  Living Environment: pt is resident in NH   Previous level of function: dependent with transfers (juliana lift); assist with ADLS; propels w/c short distances, but needs assist at times  Equipment Used at home: wheelchair, hospital bed, lift device  Assistance upon Discharge: 24/7 NH staff    Pain/Comfort:  Pain Rating 1: 10/10  Location - Side 1: Right  Location 1: leg  Pain Addressed 1: Reposition, Distraction, Cessation of Activity, Nurse notified  Pain Rating Post-Intervention 1: 10/10    Patients cultural, spiritual, Evangelical conflicts given the current situation: no    Objective:     Communicated with:  nurse prior to session.  Patient found HOB elevated with bed alarm, telemetry, PureWick upon OT entry to room.    General Precautions: Standard, fall, hearing impaired  Orthopedic Precautions: N/A  Braces: N/A  Respiratory Status: Room air     Occupational Performance:    Bed Mobility:    Patient completed Rolling/Turning to Left with  maximal assistance  Patient completed Rolling/Turning to Right with moderate assistance  Patient completed Scooting/Bridging with maximal assistance  Patient completed Supine to Sit - attempted with max A, but pt declined due to L LE is in too much pain    Functional Mobility/Transfers:  N/A - uses juliana lift at NH      Activities of Daily Living:  Feeding:  set up    Grooming: minimum assistance    LB dressing:  total A    Cognitive/Visual Perceptual:  Cognitive/Psychosocial Skills:     -       Oriented to: Person   -       Follows Commands/attention:Easily distracted  -       Communication: clear/fluent and very [place]  -       Memory: impaired  -       Safety awareness/insight to disability: impaired   -       Mood/Affect/Coping skills/emotional control: Pleasant    Physical Exam:  Balance: -     not tested - pt declined sitting EOB;  pt reports unable to stand at baseline  Postural examination/scapula alignment:    -       Rounded shoulders  -       Forward head  -       Posterior pelvic tilt  Skin integrity: Visible skin intact  Edema:  None noted  Upper Extremity Range of Motion:  BUEs with limited shoulder flexion, but WFL for ADLS   Upper Extremity Strength:  BUEs = WFL for ADLs   Strength:  B hands = fair    AMPAC 6 Click ADL:  AMPAC Total Score: 12    Treatment & Education:  Pt completed ADLs and func mobility activities for tx session as noted above  Pt educated on role of OT and POC      Patient left HOB elevated with all lines intact, call button in reach, bed alarm on, and nurse notified    GOALS:   Multidisciplinary Problems       Occupational Therapy Goals       Not  on file              Multidisciplinary Problems (Resolved)          Problem: Occupational Therapy    Goal Priority Disciplines Outcome Interventions   Occupational Therapy Goal   (Resolved)     OT, PT/OT Met                        DME Justifications:  No DME recommended requiring DME justifications    History:     History reviewed. No pertinent past medical history.      Past Surgical History:   Procedure Laterality Date    ENDOSCOPIC ULTRASOUND OF UPPER GASTROINTESTINAL TRACT N/A 2/28/2025    Procedure: ULTRASOUND, UPPER GI TRACT, ENDOSCOPIC;  Surgeon: Goyo Mai MD;  Location: [facility] ENDO;  Service: Endoscopy;  Laterality: N/A;    ERCP N/A 2/28/2025    Procedure: ERCP (ENDOSCOPIC RETROGRADE CHOLANGIOPANCREATOGRAPHY);  Surgeon: Goyo Mai MD;  Location: [facility];  Service: Endoscopy;  Laterality: N/A;       Time Tracking:     OT Date of Treatment: 03/02/25  OT Start Time: 0911  OT Stop Time: 0926  OT Total Time (min): 15 min    Billable Minutes:Evaluation 15    3/2/2025

## 2025-03-02 NOTE — PLAN OF CARE
Pt disoriented to time, place, and situation. Medications administered per order. Yellow sclera. BG monitored. Z flex boots maintained. Encouraged to call with questions/concerns/assistance. Safety.

## 2025-03-02 NOTE — ASSESSMENT & PLAN NOTE
Admit to medical floor with telemetry.    Inpatient gastroenterology consultation.  2/28 s/p AUS with fine-needle biopsy of pancreatic mass; s/p ERCP with biliary stricture and mass causing obstruction treated with stent.   Patient doing well; LFTs downtrending; abdominal pain greatly improved, still with epigastric tenderness; tolerating diet.    2/28 EUS:   Impression:            - Normal esophagus.                          - Normal stomach.                          - Normal examined duodenum.                          - A mass was identified in the pancreatic head.                          This was staged T3 N0 Mx by endosonographic                          criteria. The staging applies if malignancy is                          confirmed. Fine needle biopsy performed.   Recommendation:        - Discharge patient to home.                          - Resume previous diet.                          - Continue present medications.                          - Await path results.                          - Perform an ERCP today.     2/28 ERCP:  Impression:            - A single localized biliary stricture was found                          in the lower third of the main bile duct. The                          stricture was malignant appearing.                          - The upper third of the main bile duct and middle                          third of the main bile duct were dilated, with a                          mass causing an obstruction.                          - One uncovered metal stent was placed into the                          common bile duct.   Recommendation:        - Return patient to [facility] for ongoing care.                          - Resume previous diet.                          - Continue present medications.

## 2025-03-02 NOTE — PROGRESS NOTES
[facility] Surg  Gastroenterology  Progress Note    Patient Name: Megan Godoy  MRN: [id number]  Admission Date: 2/28/2025  Hospital Length of Stay: 1 days  Code Status: Full Code   Attending Provider: Saeid Montano,*  Consulting Provider: Diego Gooden MD  Primary Care Physician: No, Primary Doctor  Principal Problem: Biliary obstruction        Subjective:     Interval History:   Overall stable but having pain , almost everywhere   Does have abd pain  Toelrated diet this am  No vomiting    Lft improved    Reviewed procedures reports  Concern for panc cancer  Biliary stent placed      Review of Systems   Constitutional:  Positive for appetite change. Negative for chills and fever.   Respiratory:  Negative for choking and chest tightness.    Gastrointestinal:  Positive for abdominal pain. Negative for nausea and vomiting.   Neurological:  Negative for dizziness and headaches.   Psychiatric/Behavioral:  Negative for agitation and behavioral problems.      Objective:     Vital Signs (Most Recent):  Temp: 97.4 °F (36.3 °C) (03/01/25 1951)  Pulse: 63 (03/01/25 1951)  Resp: 18 (03/01/25 1951)  BP: 110/85 (03/01/25 1951)  SpO2: 95 % (03/01/25 1956) Vital Signs (24h Range):  Temp:  [97.2 °F (36.2 °C)-99 °F (37.2 °C)] 97.4 °F (36.3 °C)  Pulse:  [63-83] 63  Resp:  [18-20] 18  SpO2:  [95 %-99 %] 95 %  BP: (109-134)/(60-85) 110/85     Weight: 50 kg (110 lb 3.7 oz) (02/28/25 0309)  Body mass index is 20.16 kg/m².      Intake/Output Summary (Last 24 hours) at 3/1/2025 2129  Last data filed at 3/1/2025 2011  Gross per 24 hour   Intake 605 ml   Output 300 ml   Net 305 ml       Lines/Drains/Airways       Peripheral Intravenous Line  Duration                  Peripheral IV - Single Lumen 02/27/25 1815 20 G 1 in No Left;Posterior Forearm 2 days                     Physical Exam  Vitals reviewed.   Constitutional:       General: She is not in acute distress.  HENT:      Head: Normocephalic and atraumatic.   Eyes:       "General: No scleral icterus.     Conjunctiva/sclera: Conjunctivae normal.   Abdominal:      General: There is no distension.      Palpations: Abdomen is soft.      Tenderness: There is no abdominal tenderness.   Skin:     General: Skin is warm.      Coloration: Skin is not pale.      Findings: No rash.   Neurological:      Mental Status: She is oriented to person, place, and time.      Gait: Gait normal.   Psychiatric:         Mood and Affect: Mood normal.         Behavior: Behavior normal.          Significant Labs:  Blood Culture: No results for input(s): "LABBLOO" in the last 48 hours.  CBC:   Recent Labs   Lab 02/28/25  0356 03/01/25  0254   WBC 8.40 6.96   HGB 10.8* 9.8*   HCT 31.1* 28.1*    316     BMP:   Recent Labs   Lab 03/01/25  0254         K 3.4*      CO2 25   BUN 11   CREATININE 0.6   CALCIUM 9.0     CMP:   Recent Labs   Lab 03/01/25  0254      CALCIUM 9.0   ALBUMIN 2.4*   PROT 6.5      K 3.4*   CO2 25      BUN 11   CREATININE 0.6   ALKPHOS 519*   ALT 72*   *   BILITOT 13.5*     Coagulation:   Recent Labs   Lab 02/28/25  0550   INR 1.5*         Significant Imaging:  Imaging results within the past 24 hours have been reviewed.  Assessment/Plan:     GI  * Biliary obstruction  Eus + ERCP concern for panc malignancy    Pathology pending  S/p biliary stenting    Recs  Trend lft  Advance diet  Supportive care    Follow up path    Will sign off        Thank you for your consult. I will sign off. Please contact us if you have any additional questions.    Diego Gooden MD  Gastroenterology  [facility]  "

## 2025-03-02 NOTE — PLAN OF CARE
Problem: Occupational Therapy  Goal: Occupational Therapy Goal  Outcome: Met     Pt was agreeable to OT evaluation but had limited participation.  Pt lives in a NH where she receives assistance with all ADLS.  Pt reports staff use a juliana lift to get her in/out of bed and requires assistance with w/c mobility - pt was non-ambulatory.  Pt completed evaluation and noted to perform at baseline.  Pt will have assistance/supervision as needed once discharged home.  Due to these factors, pt is discharged from OT services.  No DME or post acute OT required at this time.     Renea Seo, OT  3/2/2025

## 2025-03-02 NOTE — NURSING
PREOPERATIVE CONSULT      CHIEF COMPLAINT: Preoperative exam.      HISTORY OF PRESENT ILLNESS: Tiffany Avalos is a 48 year old female patient who presents for preoperative exam today, requested by Dr. Ramy Carson (Ortho).    Date of Surgery: 02/26/2019  Site: [facility]   Procedure: Right knee arthroscopy     History of previous surgeries? Yes see surgical history  History of anesthesia problems? No  Family history of anesthesia problems? No  Previous history of blood clots/pulmonary embolism? No  History of kidney problems? No  History of heart attack? No  Any chest pain or shortness of breath when climbing two flights of stairs at a normal speed? No  Use of aspirin, anti-inflammatories, or other blood thinners? Yes meloxicam  History of sleep apnea? Yes on CPAP  Recent illness? No    Past Medical History:   Diagnosis Date   • Anxiety    • Arterial ischemic stroke, vertebrobasilar, brainstem, remote, resolved 2012   • Arthritis     Knees, back   • Asthma     Followed by Dr. Bob Huynh [phone]   • Bronchitis childhood   • Cerebral infarction (CMS/HCC)     2012, brain aneurysm   • Depression    • Essential (primary) hypertension    • History of chicken pox    • Kidney stones    • Miscarriage     X 1   • Normal vaginal delivery     X 2   • SANDY (obstructive sleep apnea) 02/2019     per pt \"not using CPAP\"   • SANDY on CPAP PSG=01/31/2007; AHI=13    CPAP at 13 cm H2O w/ Hybrid mask (Resmed; NEED CPAP CARD;Eight19). Followed by Dr. Bob Huynh [phone]   • Other and unspecified hyperlipidemia    • Other chronic pain     generalized aches and pains   • Panic attacks    • Personal history of traumatic fracture circa 1982    greenstick break in Right arm   • RAD (reactive airway disease)    • Restless legs syndrome (RLS)    • Right knee pain    • Spasmodic torticollis     degenerative disc disease   • Thumb laceration 4/11/14    right   • Tobacco use    • Unspecified sinusitis (chronic)    • Urinary  Received report from Alaina, received the patient lying supine awake and alert, positioned for comfort, bed locked in low with alarm on and call light in right hand, instructed to call for assistance.    tract infection    • Wears glasses    • Wound dehiscence 2/13/2016    Lumbar spine wound s/p TLIF       Patient Active Problem List    Diagnosis Date Noted   • Tremors of nervous system 12/08/2015     Priority: Low   • Carpal tunnel syndrome on both sides 12/08/2015     Priority: Low   • Arthritis of both knees 12/08/2015     Priority: Low   • Hyperlipidemia 12/08/2015     Priority: Low   • Benign essential hypertension 12/08/2015     Priority: Low   • Benign joint hypermobility 03/18/2015     Priority: Low   • Brain aneurysm 10/22/2014     Priority: Low   • Disc disease, degenerative, lumbar or lumbosacral 09/05/2014     Priority: Low   • Low back pain 09/05/2014     Priority: Low   • SANDY on CPAP 03/13/2013     Priority: Low     Followed by Dr. Huynh [phone]     • Extrinsic asthma, unspecified 03/13/2013     Priority: Low     Followed by Dr. Huynh [phone]     • Spasmodic torticollis      Priority: Low       Past Surgical History:   Procedure Laterality Date   • Hysterectomy      partial, ovaries remain   • Ir cerebral angiogram  2/5/15   • Kidney stone surgery  2010, 2011    with stent placement   • Knee surgery      right knee 4-12-13   • Lumbar spine fusn,post interbody  1/14/2016    Dr. Medina   • Shoulder arthroscopy Right 07/16/2010   • Shoulder arthroscopy Left 12/19/2013       Current Outpatient Medications   Medication Sig   • cannabidiol (CBD)    • HYDROcodone-acetaminophen (NORCO) 5-325 MG per tablet Take 1 tablet by mouth 3 times daily as needed for Pain.   • Temazepam 30 MG capsule TAKE 1 CAPSULE BY MOUTH EVERY DAY AT BEDTIME AS NEEDED FOR SLEEP   • lisinopril (ZESTRIL) 5 MG tablet TAKE 1 TABLET BY MOUTH  EVERY DAY (Patient taking differently: TAKE 1 TABLET BY MOUTH  EVERY DAY (takes at HS))   • primidone (MYSOLINE) 50 MG tablet TAKE 3 TABLETS BY MOUTH 3  TIMES DAILY   • lovastatin (MEVACOR) 40 MG tablet TAKE 1 TABLET BY MOUTH  DAILY   • amLODIPine (NORVASC) 10 MG tablet TAKE 1 TABLET BY  MOUTH  DAILY   • cholecalciferol (VITAMIN D3) 1000 UNITS tablet Take 1,000 Units by mouth daily.   • gabapentin (NEURONTIN) 100 MG capsule Take 1 capsule by mouth 3 times daily. (Patient taking differently: Take 200 mg by mouth 3 times daily. )   • Omega-3 Fatty Acids (FISH OIL PO) Take by mouth daily.    • acetaminophen (TYLENOL) 500 MG tablet Take 1,500 mg by mouth every 6 hours as needed. Dose:  3 tablets (=1500 mg)   Indications: Pain   • buPROPion (WELLBUTRIN SR) 200 MG 12 hr tablet Take 200 mg by mouth 2 times daily.    Indications: Major Depressive Disorder   • Multiple Vitamin (MULTIVITAMINS PO) Take 1 tablet by mouth daily. Indications: General Health    • sertraline (ZOLOFT) 100 MG tablet Take 200 mg by mouth daily. Indications: Depression Dose:  2 tablets (=200 mg)     No current facility-administered medications for this visit.        ALLERGIES:  No Known Allergies      FAMILY HISTORY:     Patient denies any family history of clotting or bleeding disorders or trouble with anesthesia.     Family History   Problem Relation Age of Onset   • Diabetes Mother    • Arthritis Mother    • High blood pressure Mother    • Heart disease Father    • Arthritis Father    • High blood pressure Father    • Neurological Disorder Brother         Spina Bifida   • Arthritis Sister    • Psychiatric Sister         PTSD   • Cancer Maternal Grandfather         skin   • Asthma Son          SOCIAL HISTORY:    Social History     Socioeconomic History   • Marital status: Single     Spouse name: Not on file   • Number of children: 2   • Years of education: Not on file   • Highest education level: Not on file   Social Needs   • Financial resource strain: Not on file   • Food insecurity - worry: Not on file   • Food insecurity - inability: Not on file   • Transportation needs - medical: Not on file   • Transportation needs - non-medical: Not on file   Occupational History   • Occupation: Postal     Comment: retired   Tobacco Use   •  Smoking status: Former Smoker     Packs/day: 0.50     Years: 30.00     Pack years: 15.00     Types: Cigarettes     Last attempt to quit: 2018     Years since quittin.0   • Smokeless tobacco: Never Used   • Tobacco comment: per pt \"less than 1 ppd \"   Substance and Sexual Activity   • Alcohol use: Yes     Alcohol/week: 0.0 oz     Comment: \"pitcher of beer every Monday and Friday\"   • Drug use: Yes     Frequency: 2.0 times per week     Types: Marijuana   • Sexual activity: Not on file   Other Topics Concern   •  Service Not Asked   • Blood Transfusions Not Asked   • Caffeine Concern Not Asked   • Occupational Exposure Not Asked   • Hobby Hazards Not Asked   • Sleep Concern Not Asked   • Stress Concern Not Asked   • Weight Concern Not Asked   • Special Diet Not Asked   • Back Care Not Asked   • Exercise Not Asked   • Bike Helmet Not Asked   • Seat Belt Yes   • Self-Exams Not Asked   Social History Narrative    Menarche:  12.    Hysterectomy for Menorrhagia.         REVIEW OF SYSTEMS:     General: Denies significant weight changes. Denies fevers, chills, or night sweats.     Skin: Denies significant rashes or new lesions.     HEENT: Denies recent visual changes, dizziness, headaches, sore throat, hoarseness or difficulty swallowing. Denies otalgia or hearing loss, tinnitus, vertigo, epistaxis, sinus congestion, dental problem or loose teeth.     Cardiovascular: Denies chest pain, shortness of breath, palpitations, or edema.     Respiratory: Denies cough, asthma, wheezing or dyspnea on exertion.    Gastrointestinal: Denies abdominal pain, nausea, vomiting, diarrhea, or blood in stool. Denies heartburn or reflux.     Genitourinary: Denies dysuria, urinary frequency, urgency or hematuria.     Musculoskeletal: Reports myalgias, joint pains or swelling.     Endocrine: Denies known thyroid disorder or diabetes. Denies heat or cold intolerance. Denies polyuria, polyphagia and polydipsia.      Neurologic:  Denies extremity paresthesias or focal weakness, denies history of seizures. Has tremor.     Psychiatric: Denies anxiety and depression.         PHYSICAL EXAM:     Visit Vitals  /84   Pulse 80   Wt 101 kg   SpO2 97%   BMI 39.44 kg/m²       General Appearance: Pleasant, cooperative patient. Well-developed, well-nourished female in no acute distress.     Skin: Warm, dry, no rash.     Head: Normocephalic, atraumatic, no lesions.     Ears: External ears normal. Canals clear. Tympanic membranes clear with all landmarks noted.     Eyes: Pupils equal, round, and reactive to light and accommodation. Extraocular movements are intact. Conjunctivae and lids normal, sclera anicteric. Red reflex visible bilaterally.     Nose: Patent and without erythema, edema or discharge   Mouth and Throat: Lips, mucosa, and tongue normal. Moist mucous membranes. Teeth and gums normal. Oropharynx clear. Posterior pharynx without erythema, edema or exudate.    Neck: Supple, no lymphadenopathy, no thyromegaly. No jugular venous distention. Carotids 2+ and equal, no bruits.    Chest: Symmetrical and no chest deformities noted.     Lungs: Clear to auscultation bilaterally.      Back: Straight, without deformity or pain.    Heart: Regular rate and rhythm with normal S1, S2; no S3, S4, gallops or murmurs.     Abdomen: Positive bowel sounds, no bruits. Abdomen soft, nontender, no masses or hepatosplenomegaly palpated, exam however limited by body habitus.    Extremities: No cyanosis, clubbing or edema bilaterally. Distal pulses 2+ and symmetric bilaterally (radial, pedal).    Neurologic: Alert and oriented x3. Pleasant affect, appropriate in conversation. Range of motion and motor strength to upper and lower extremities grossly intact. Gait steady. Positive for tremor.      ASSESSMENT:     1. Pre-op evaluation    2. SANDY on CPAP    3. Hyperlipidemia, unspecified hyperlipidemia type    4. Benign essential hypertension    5. Tremors of nervous  system          PLAN:       1. Preoperative Exam -  Labs: CBC, BMP, UA, Chest, pa & lat and EKG. A copy of this preoperative medical evaluation will be forwarded (chart cc'd) to Dr. Ramy Carson for use with surgery. So long as preoperative testing results are appropriate, I see no contraindication to planned procedure.        On 2/22/2019, Ely WATSON scribed the services personally performed by Dami Vines MD    2/22/2019

## 2025-03-02 NOTE — ASSESSMENT & PLAN NOTE
Eus + ERCP concern for panc malignancy    Pathology pending  S/p biliary stenting    Recs  Trend lft  Advance diet  Supportive care    Follow up path    Will sign off

## 2025-03-02 NOTE — PLAN OF CARE
Problem: Adult Inpatient Plan of Care  Goal: Plan of Care Review  3/2/2025 1245 by Trixie Mckenna, RN  Outcome: Progressing     Problem: Adult Inpatient Plan of Care  Goal: Patient-Specific Goal (Individualized)  3/2/2025 1245 by Trixie Mckenna, RN  Outcome: Progressing     VN note:   Patient's chart, labs and vital signs reviewed, will be available to intervene if needed.

## 2025-03-02 NOTE — SUBJECTIVE & OBJECTIVE
Subjective:     Interval History:   Overall stable but having pain , almost everywhere   Does have abd pain  Toelrated diet this am  No vomiting    Lft improved    Reviewed procedures reports  Concern for panc cancer  Biliary stent placed      Review of Systems   Constitutional:  Positive for appetite change. Negative for chills and fever.   Respiratory:  Negative for choking and chest tightness.    Gastrointestinal:  Positive for abdominal pain. Negative for nausea and vomiting.   Neurological:  Negative for dizziness and headaches.   Psychiatric/Behavioral:  Negative for agitation and behavioral problems.      Objective:     Vital Signs (Most Recent):  Temp: 97.4 °F (36.3 °C) (03/01/25 1951)  Pulse: 63 (03/01/25 1951)  Resp: 18 (03/01/25 1951)  BP: 110/85 (03/01/25 1951)  SpO2: 95 % (03/01/25 1956) Vital Signs (24h Range):  Temp:  [97.2 °F (36.2 °C)-99 °F (37.2 °C)] 97.4 °F (36.3 °C)  Pulse:  [63-83] 63  Resp:  [18-20] 18  SpO2:  [95 %-99 %] 95 %  BP: (109-134)/(60-85) 110/85     Weight: 50 kg (110 lb 3.7 oz) (02/28/25 0309)  Body mass index is 20.16 kg/m².      Intake/Output Summary (Last 24 hours) at 3/1/2025 2129  Last data filed at 3/1/2025 2011  Gross per 24 hour   Intake 605 ml   Output 300 ml   Net 305 ml       Lines/Drains/Airways       Peripheral Intravenous Line  Duration                  Peripheral IV - Single Lumen 02/27/25 1815 20 G 1 in No Left;Posterior Forearm 2 days                     Physical Exam  Vitals reviewed.   Constitutional:       General: She is not in acute distress.  HENT:      Head: Normocephalic and atraumatic.   Eyes:      General: No scleral icterus.     Conjunctiva/sclera: Conjunctivae normal.   Abdominal:      General: There is no distension.      Palpations: Abdomen is soft.      Tenderness: There is no abdominal tenderness.   Skin:     General: Skin is warm.      Coloration: Skin is not pale.      Findings: No rash.   Neurological:      Mental Status: She is oriented to  "person, place, and time.      Gait: Gait normal.   Psychiatric:         Mood and Affect: Mood normal.         Behavior: Behavior normal.          Significant Labs:  Blood Culture: No results for input(s): "LABBLOO" in the last 48 hours.  CBC:   Recent Labs   Lab 02/28/25  0356 03/01/25  0254   WBC 8.40 6.96   HGB 10.8* 9.8*   HCT 31.1* 28.1*    316     BMP:   Recent Labs   Lab 03/01/25  0254         K 3.4*      CO2 25   BUN 11   CREATININE 0.6   CALCIUM 9.0     CMP:   Recent Labs   Lab 03/01/25  0254      CALCIUM 9.0   ALBUMIN 2.4*   PROT 6.5      K 3.4*   CO2 25      BUN 11   CREATININE 0.6   ALKPHOS 519*   ALT 72*   *   BILITOT 13.5*     Coagulation:   Recent Labs   Lab 02/28/25  0550   INR 1.5*         Significant Imaging:  Imaging results within the past 24 hours have been reviewed.  "

## 2025-03-03 VITALS
RESPIRATION RATE: 16 BRPM | HEIGHT: 62 IN | BODY MASS INDEX: 20.29 KG/M2 | OXYGEN SATURATION: 96 % | WEIGHT: 110.25 LBS | SYSTOLIC BLOOD PRESSURE: 105 MMHG | HEART RATE: 68 BPM | TEMPERATURE: 98 F | DIASTOLIC BLOOD PRESSURE: 66 MMHG

## 2025-03-03 PROBLEM — K86.89 PANCREATIC MASS: Status: ACTIVE | Noted: 2025-03-03

## 2025-03-03 LAB
CK SERPL-CCNC: 51 U/L (ref 20–180)
MAGNESIUM SERPL-MCNC: 1.8 MG/DL (ref 1.6–2.6)
PHOSPHATE SERPL-MCNC: 2.5 MG/DL (ref 2.7–4.5)
POCT GLUCOSE: 130 MG/DL (ref 70–110)
POCT GLUCOSE: 153 MG/DL (ref 70–110)
POCT GLUCOSE: 172 MG/DL (ref 70–110)

## 2025-03-03 PROCEDURE — 25000003 PHARM REV CODE 250

## 2025-03-03 PROCEDURE — 36415 COLL VENOUS BLD VENIPUNCTURE: CPT

## 2025-03-03 PROCEDURE — 25000003 PHARM REV CODE 250: Performed by: FAMILY MEDICINE

## 2025-03-03 PROCEDURE — 83735 ASSAY OF MAGNESIUM: CPT

## 2025-03-03 PROCEDURE — 84100 ASSAY OF PHOSPHORUS: CPT

## 2025-03-03 PROCEDURE — 99900035 HC TECH TIME PER 15 MIN (STAT)

## 2025-03-03 PROCEDURE — 82550 ASSAY OF CK (CPK): CPT

## 2025-03-03 RX ADMIN — LISINOPRIL 20 MG: 20 TABLET ORAL at 09:03

## 2025-03-03 RX ADMIN — PREGABALIN 50 MG: 50 CAPSULE ORAL at 03:03

## 2025-03-03 RX ADMIN — POLYETHYLENE GLYCOL (3350) 17 G: 17 POWDER, FOR SOLUTION ORAL at 09:03

## 2025-03-03 RX ADMIN — SERTRALINE HYDROCHLORIDE 25 MG: 25 TABLET ORAL at 09:03

## 2025-03-03 RX ADMIN — METOPROLOL SUCCINATE 25 MG: 25 TABLET, EXTENDED RELEASE ORAL at 09:03

## 2025-03-03 RX ADMIN — PREGABALIN 50 MG: 50 CAPSULE ORAL at 09:03

## 2025-03-03 RX ADMIN — PANTOPRAZOLE SODIUM 40 MG: 40 TABLET, DELAYED RELEASE ORAL at 09:03

## 2025-03-03 RX ADMIN — ACETAMINOPHEN 650 MG: 325 TABLET ORAL at 10:03

## 2025-03-03 RX ADMIN — ATORVASTATIN CALCIUM 10 MG: 10 TABLET, FILM COATED ORAL at 09:03

## 2025-03-03 RX ADMIN — RIVAROXABAN 15 MG: 15 TABLET, FILM COATED ORAL at 09:03

## 2025-03-03 NOTE — ASSESSMENT & PLAN NOTE
Patient's blood pressure range in the last 24 hours was: BP  Min: 100/64  Max: 153/78.The patient's inpatient anti-hypertensive regimen is listed below:  Current Antihypertensives  lisinopriL tablet 20 mg, Daily, Oral  metoprolol succinate (TOPROL-XL) 24 hr tablet 25 mg, Daily, Oral    Plan  - BP is controlled, no changes needed to their regimen  - monitor blood pressure closely.

## 2025-03-03 NOTE — ASSESSMENT & PLAN NOTE
Patient has paroxysmal (<7 days) atrial fibrillation. Patient is currently in sinus rhythm. [id number] Score: 4. The patients heart rate in the last 24 hours is as follows:  Pulse  Min: 66  Max: 82     Antiarrhythmics  metoprolol succinate (TOPROL-XL) 24 hr tablet 25 mg, Daily, Oral    Anticoagulants  rivaroxaban tablet 15 mg, Daily, Oral    Plan  - Replete lytes with a goal of K>4, Mg >2  - Patient is anticoagulated, see medications listed above.  - Patient's afib is currently controlled  - restarted anticoagulation

## 2025-03-03 NOTE — PROGRESS NOTES
03/03/25 1105   Nurse Notification   Charge Nurse Notified? Yes   Name of Charge Nurse Priscila Uribe RN   Bedside Nurse Notified? Yes   Name of Bedside Nurse Radha Ashraf RN   Nurse Notfication Method Secure Chat   Nurse Notified Of Other  (AVS prepared and okay to add to NH transfer packet. Pending CM clearance.)    Notification    Notified? Yes   Name of  Myrna Mckenna RN    Notification Method Secure Chat    Notified Of Discharge Status

## 2025-03-03 NOTE — ASSESSMENT & PLAN NOTE
Mass in head of pancreas contributing to biliary obstruction noted on EUS 2/28/2025 and biopsied. Concern for malignancy.    Ambulatory referral to Oncology at discharge for outpatient evaluation and management  Follow up biopsy results

## 2025-03-03 NOTE — PLAN OF CARE
Problem: Adult Inpatient Plan of Care  Goal: Plan of Care Review  Outcome: Progressing  Goal: Patient-Specific Goal (Individualized)  Outcome: Progressing  Goal: Absence of Hospital-Acquired Illness or Injury  Outcome: Progressing  Goal: Optimal Comfort and Wellbeing  Outcome: Progressing  Goal: Readiness for Transition of Care  Outcome: Progressing     Problem: UTI (Urinary Tract Infection)  Goal: Improved Infection Symptoms  Outcome: Progressing     Problem: Fall Injury Risk  Goal: Absence of Fall and Fall-Related Injury  Outcome: Progressing

## 2025-03-03 NOTE — ASSESSMENT & PLAN NOTE
Strict I's and O's.    Gentle diuresis with Lasix p.r.n..    No respiratory distress on RA  Improved BLE edema with Lasix IV  Lasix 40mg qd prn for worsening leg swelling, dyspnea or weight gain at discharge  Continue lisinopril, Toprol at discharge

## 2025-03-03 NOTE — DISCHARGE SUMMARY
[facility]  Discharge Summary      Patient Name: Megan Godoy  MRN: [id number]  MG: [id number]  Patient Class: IP- Inpatient  Admission Date: 2/28/2025  Hospital Length of Stay: 3 days  Discharge Date and Time:  03/03/2025 12:20 PM  Attending Physician: Saeid Montano,*   Discharging Provider: Jose Hilton PA-C  Primary Care Provider: Yamileth, Primary Doctor    Primary Care Team: Networked reference to record PCT     HPI:   Megan Godoy is a 89 y.o. female with past medical history of CHF, AFib, dementia, anemia and hypertension.  Patient presents to [facility] -ED with abnormal labs.  Per EMS, nursing home reported patient having yellowing of skin.  In ED patient had CT scan of abdomen that showed distended gallbladder containing stones and sludge, intra and extrahepatic biliary dilation and mild pancreatic ductal dilation.  Patient transferred to [facility] for higher level of care.  Patient is a poor historian and most of the history is obtained from previous medical records.    Procedure(s) (LRB):  ULTRASOUND, UPPER GI TRACT, ENDOSCOPIC (N/A)  ERCP (ENDOSCOPIC RETROGRADE CHOLANGIOPANCREATOGRAPHY) (N/A)      Hospital Course:   No notes on file     Goals of Care Treatment Preferences:  Code Status: Full Code      SDOH Screening:  The patient was unable to be screened for utility difficulties, food insecurity, transport difficulties, housing insecurity, and interpersonal safety, so no concerns could be identified this admission.     Consults:   Consults (From admission, onward)          Status Ordering Provider     Gastroenterology  Once        Provider:  (Not yet assigned)    AUSTIN Billingsley            * Biliary obstruction  Admit to medical floor with telemetry.    Inpatient gastroenterology consultation.  2/28 s/p AUS with fine-needle biopsy of pancreatic mass; s/p ERCP with biliary stricture and mass causing obstruction treated with stent.    Patient doing well and tolerating diet; no N/V/D; LFTs downtrending; abdominal pain greatly improved  Follow up with GI outpatient    2/28 EUS:   Impression:            - Normal esophagus.                          - Normal stomach.                          - Normal examined duodenum.                          - A mass was identified in the pancreatic head.                          This was staged T3 N0 Mx by endosonographic                          criteria. The staging applies if malignancy is                          confirmed. Fine needle biopsy performed.   Recommendation:        - Discharge patient to home.                          - Resume previous diet.                          - Continue present medications.                          - Await path results.                          - Perform an ERCP today.     2/28 ERCP:  Impression:            - A single localized biliary stricture was found                          in the lower third of the main bile duct. The                          stricture was malignant appearing.                          - The upper third of the main bile duct and middle                          third of the main bile duct were dilated, with a                          mass causing an obstruction.                          - One uncovered metal stent was placed into the                          common bile duct.   Recommendation:        - [facility].                          - Resume previous diet.                          - Continue present medications.       Pancreatic mass  Mass in head of pancreas contributing to biliary obstruction noted on EUS 2/28/2025 and biopsied. Concern for malignancy.    Ambulatory referral to Oncology at discharge for outpatient evaluation and management  Follow up biopsy results      A-fib  Patient has paroxysmal (<7 days) atrial fibrillation. Patient is currently in sinus rhythm. [id number] Score: 4. The patients heart  rate in the last 24 hours is as follows:  Pulse  Min: 66  Max: 82     Antiarrhythmics  metoprolol succinate (TOPROL-XL) 24 hr tablet 25 mg, Daily, Oral    Anticoagulants  rivaroxaban tablet 15 mg, Daily, Oral    Plan  - Replete lytes with a goal of K>4, Mg >2  - Patient is anticoagulated, see medications listed above.  - Patient's afib is currently controlled  - restarted anticoagulation         HTN (hypertension)  Patient's blood pressure range in the last 24 hours was: BP  Min: 100/64  Max: 153/78.The patient's inpatient anti-hypertensive regimen is listed below:  Current Antihypertensives  lisinopriL tablet 20 mg, Daily, Oral  metoprolol succinate (TOPROL-XL) 24 hr tablet 25 mg, Daily, Oral    Plan  - BP is controlled, no changes needed to their regimen  - monitor blood pressure closely.    (HFpEF) heart failure with preserved ejection fraction  Strict I's and O's.    Gentle diuresis with Lasix p.r.n..    No respiratory distress on RA  Improved BLE edema with Lasix IV  Lasix 40mg qd prn for worsening leg swelling, dyspnea or weight gain at discharge  Continue lisinopril, Toprol at discharge      Type 2 diabetes mellitus  Patient's FSGs are controlled on current medication regimen.  Last A1c reviewed-   Lab Results   Component Value Date    HGBA1C 4.7 02/28/2025     Most recent fingerstick glucose reviewed-   Recent Labs   Lab 03/02/25  1627 03/02/25  2137 03/03/25  0629 03/03/25  1136   POCTGLUCOSE 149* 133* 130* 153*       Current correctional scale  Low  Maintain anti-hyperglycemic dose as follows-   Antihyperglycemics (From admission, onward)      Start     Stop Route Frequency Ordered    02/28/25 1810  insulin aspart U-100 pen 0-5 Units         -- SubQ Before meals & nightly PRN 02/28/25 1711          Hold Oral hypoglycemics while patient is in the hospital.    Dementia  Patient with dementia with likely etiology of unknown dementia. Dementia is severe. The patient does not have signs of behavioral  disturbance. Home dementia medications are Held or Continued: continued.. Continue non-pharmacologic interventions to prevent delirium (No VS between 11PM-5AM, activity during day, opening blinds, providing glasses/hearing aids, and up in chair during daytime). Will avoid narcotics and benzos unless absolutely necessary. PRN anti-psychotics are not prescribed to avoid self harm behaviors.      Final Active Diagnoses:    Diagnosis Date Noted POA    PRINCIPAL PROBLEM:  Biliary obstruction [K83.1] 02/28/2025 Yes    Pancreatic mass [K86.89] 03/03/2025 Yes    Dementia [F03.90] 02/28/2025 Yes    Type 2 diabetes mellitus [E11.9] 02/28/2025 Yes    (HFpEF) heart failure with preserved ejection fraction [I50.30] 02/28/2025 Yes    HTN (hypertension) [I10] 02/28/2025 Yes    A-fib [I48.91] 02/28/2025 Yes      Problems Resolved During this Admission:       Discharged Condition: stable    [facility]    Follow Up:    Patient Instructions:      Ambulatory referral/consult to Gastroenterology   Standing Status: Future   Referral Priority: Routine Referral Type: Consultation   Referral Reason: Specialty Services Required   Requested Specialty: Gastroenterology   Number of Visits Requested: 1     Ambulatory referral/consult to Hematology / Oncology   Standing Status: Future   Referral Priority: Routine Referral Type: Consultation   Referral Reason: Specialty Services Required   Requested Specialty: Hematology and Oncology   Number of Visits Requested: 1     Diet diabetic     Diet Cardiac     Notify your health care provider if you experience any of the following:  temperature >100.4     Notify your health care provider if you experience any of the following:  persistent nausea and vomiting or diarrhea     Notify your health care provider if you experience any of the following:  severe uncontrolled pain     Notify your health care provider if you experience any of the following:  difficulty breathing or  increased cough     Notify your health care provider if you experience any of the following:  severe persistent headache     Notify your health care provider if you experience any of the following:  persistent dizziness, light-headedness, or visual disturbances     Notify your health care provider if you experience any of the following:  increased confusion or weakness     Activity as tolerated       Significant Diagnostic Studies:     EXAMINATION:  CT ABDOMEN PELVIS WITH CONTRAST (PANCREAS PROTOCOL)     CLINICAL HISTORY:  Abdominal mass, intra-abdominal neoplasm suspected;pancreatic protocol;     TECHNIQUE:  CT abdomen pelvis with intravenous contrast.  Coronal and sagittal reformatted images were obtained     COMPARISON:  Same day ultrasound     FINDINGS:  Distended gallbladder containing stones and sludge.  Intrahepatic and extrahepatic biliary ductal dilatation.  Mild pancreatic ductal dilatation.  No discrete pancreatic mass.     Unremarkable kidneys.  Bladder wall thickening may relate to cystitis.  No small bowel obstruction.  Unremarkable spleen.  Atherosclerosis of the abdominal aorta and its branches.     Impression:     Distended gallbladder containing stones and sludge. Intrahepatic and extrahepatic biliary ductal dilatation.  Mild pancreatic ductal dilatation. No discrete pancreatic mass.     Recommend MRI/MRCP        Electronically signed by:Ruperto Song  Date:                                            02/28/2025  Time:                                           01:58    Pending Diagnostic Studies:       Procedure Component Value Units Date/Time    Cytology-FNA Non-Radiology Clinician Performed w/o on site [[id number]] Collected: 02/28/25 1348    Order Status: Sent Lab Status: In process Updated: 02/28/25 1446    FL ERCP Biliary And Pancreatic By Rad Tech [[id number]] Resulted: 02/28/25 1640    Order Status: Sent Lab Status: In process Updated: 02/28/25 1644           Medications:  Reconciled  Home Medications:      Medication List        CONTINUE taking these medications      albuterol-ipratropium 2.5 mg-0.5 mg/3 mL nebulizer solution  Commonly known as: DUO-NEB  Take 3 mLs by nebulization every 6 (six) hours as needed for Shortness of Breath.     atorvastatin 10 MG tablet  Commonly known as: LIPITOR  Take 10 mg by mouth once daily.     benazepriL 20 MG tablet  Commonly known as: LOTENSIN  Take 20 mg by mouth once daily.     metoprolol succinate 25 MG 24 hr tablet  Commonly known as: TOPROL-XL  Take 25 mg by mouth once daily.     pantoprazole 40 MG tablet  Commonly known as: PROTONIX  Take 40 mg by mouth once daily.     pregabalin 50 MG capsule  Commonly known as: LYRICA  Take 50 mg by mouth 3 (three) times daily.     sertraline 25 MG tablet  Commonly known as: ZOLOFT  Take 25 mg by mouth once daily.     XARELTO 15 mg Tab  Generic drug: rivaroxaban  Take 15 mg by mouth once daily.              Indwelling Lines/Drains at time of discharge:   Lines/Drains/Airways       Drain  Duration             Female External Urinary Catheter w/ Suction 03/02/25 1 day                    Time spent on the discharge of patient: 20 minutes         Jose Hilton PA-C  Department of Hospital Medicine  [facility] [place]

## 2025-03-03 NOTE — PLAN OF CARE
Problem: Adult Inpatient Plan of Care  Goal: Plan of Care Review  Outcome: Progressing     Problem: Fall Injury Risk  Goal: Absence of Fall and Fall-Related Injury  Outcome: Progressing     Problem: Diabetes  Goal: Blood Glucose Level Within Target Range  Outcome: Progressing     Problem: Comorbidity Management  Goal: Blood Pressure in Desired Range  Outcome: Progressing     Problem: Skin Injury Risk Increased  Goal: Skin Health and Integrity  Outcome: Progressing     Problem: Wound  Goal: Optimal Wound Healing  Outcome: Progressing     Problem: Diabetes Comorbidity  Goal: Blood Glucose Level Within Targeted Range  Outcome: Progressing

## 2025-03-03 NOTE — ASSESSMENT & PLAN NOTE
Admit to medical floor with telemetry.    Inpatient gastroenterology consultation.  2/28 s/p AUS with fine-needle biopsy of pancreatic mass; s/p ERCP with biliary stricture and mass causing obstruction treated with stent.   Patient doing well and tolerating diet; no N/V/D; LFTs downtrending; abdominal pain greatly improved  Follow up with GI outpatient    2/28 EUS:   Impression:            - Normal esophagus.                          - Normal stomach.                          - Normal examined duodenum.                          - A mass was identified in the pancreatic head.                          This was staged T3 N0 Mx by endosonographic                          criteria. The staging applies if malignancy is                          confirmed. Fine needle biopsy performed.   Recommendation:        - Discharge patient to home.                          - Resume previous diet.                          - Continue present medications.                          - Await path results.                          - Perform an ERCP today.     2/28 ERCP:  Impression:            - A single localized biliary stricture was found                          in the lower third of the main bile duct. The                          stricture was malignant appearing.                          - The upper third of the main bile duct and middle                          third of the main bile duct were dilated, with a                          mass causing an obstruction.                          - One uncovered metal stent was placed into the                          common bile duct.   Recommendation:        - [facility] to [facility] for [facility].                          - Resume previous diet.                          - Continue present medications.

## 2025-03-03 NOTE — PLAN OF CARE
Pt is medically ready to discharge today back to [facility]. Report to call information, given to floor nurse. Transport packet at nurse station. Transport ordered for hospital ambulance and scheduled for 3:30 PM. Nurse agreeable. Spoke with Shaina in admit, notified of transport time. Per Shaina in admit, they will schedule referral appointments.      03/03/25 1500   Final Note   Assessment Type Final Discharge Note   Anticipated Discharge Disposition Chi Fac  ([facility])   What phone number can be called within the next 1-3 days to see how you are doing after discharge? [phone]   Hospital Resources/Appts/Education Provided Appointments scheduled and added to AVS   Post-Acute Status   Discharge Delays None known at this time

## 2025-03-03 NOTE — ASSESSMENT & PLAN NOTE
Patient's FSGs are controlled on current medication regimen.  Last A1c reviewed-   Lab Results   Component Value Date    HGBA1C 4.7 02/28/2025     Most recent fingerstick glucose reviewed-   Recent Labs   Lab 03/02/25  1627 03/02/25  2137 03/03/25  0629 03/03/25  1136   POCTGLUCOSE 149* 133* 130* 153*       Current correctional scale  Low  Maintain anti-hyperglycemic dose as follows-   Antihyperglycemics (From admission, onward)    Start     Stop Route Frequency Ordered    02/28/25 1810  insulin aspart U-100 pen 0-5 Units         -- SubQ Before meals & nightly PRN 02/28/25 1711        Hold Oral hypoglycemics while patient is in the hospital.

## 2025-03-07 NOTE — ANESTHESIA POSTPROCEDURE EVALUATION
Anesthesia Post Evaluation    Patient: Megan Godoy    Procedure(s) Performed: Procedure(s) (LRB):  ULTRASOUND, UPPER GI TRACT, ENDOSCOPIC (N/A)  ERCP (ENDOSCOPIC RETROGRADE CHOLANGIOPANCREATOGRAPHY) (N/A)    Final Anesthesia Type: general      Patient location during evaluation: PACU  Patient participation: Yes- Able to Participate  Level of consciousness: awake  Post-procedure vital signs: reviewed and stable  Pain management: adequate  Airway patency: patent    PONV status at discharge: No PONV  Anesthetic complications: no      Cardiovascular status: blood pressure returned to baseline  Respiratory status: unassisted  Hydration status: euvolemic  Follow-up not needed.              Vitals Value Taken Time   /66 03/03/25 15:23   Temp 36.4 °C (97.5 °F) 03/03/25 15:23   Pulse 68 03/03/25 15:23   Resp 16 03/03/25 15:23   SpO2 96 % 03/03/25 15:23         No case tracking events are documented in the log.      Pain/Pankaj Score: No data recorded

## 2025-03-10 LAB
FINAL PATHOLOGIC DIAGNOSIS: ABNORMAL
MICROSCOPIC EXAM: ABNORMAL

## 2025-03-11 ENCOUNTER — TELEPHONE (OUTPATIENT)
Dept: HEMATOLOGY/ONCOLOGY | Facility: CLINIC | Age: OVER 89
End: 2025-03-11
Payer: MEDICARE

## 2025-03-11 ENCOUNTER — OFFICE VISIT (OUTPATIENT)
Dept: HEMATOLOGY/ONCOLOGY | Facility: CLINIC | Age: OVER 89
End: 2025-03-11
Payer: MEDICARE

## 2025-03-11 VITALS
RESPIRATION RATE: 19 BRPM | HEART RATE: 115 BPM | TEMPERATURE: 98 F | SYSTOLIC BLOOD PRESSURE: 104 MMHG | DIASTOLIC BLOOD PRESSURE: 53 MMHG

## 2025-03-11 DIAGNOSIS — I48.0 PAROXYSMAL ATRIAL FIBRILLATION: ICD-10-CM

## 2025-03-11 DIAGNOSIS — K83.1 BILIARY OBSTRUCTION: ICD-10-CM

## 2025-03-11 DIAGNOSIS — C25.0 MALIGNANT NEOPLASM OF HEAD OF PANCREAS: Primary | ICD-10-CM

## 2025-03-11 DIAGNOSIS — I10 PRIMARY HYPERTENSION: ICD-10-CM

## 2025-03-11 DIAGNOSIS — K86.89 PANCREATIC MASS: ICD-10-CM

## 2025-03-11 PROCEDURE — 99205 OFFICE O/P NEW HI 60 MIN: CPT | Mod: S$GLB,,, | Performed by: INTERNAL MEDICINE

## 2025-03-11 PROCEDURE — 1111F DSCHRG MED/CURRENT MED MERGE: CPT | Mod: CPTII,S$GLB,, | Performed by: INTERNAL MEDICINE

## 2025-03-11 PROCEDURE — 99999 PR PBB SHADOW E&M-EST. PATIENT-LVL II: CPT | Mod: PBBFAC,,, | Performed by: INTERNAL MEDICINE

## 2025-03-11 PROCEDURE — G2211 COMPLEX E/M VISIT ADD ON: HCPCS | Mod: S$GLB,,, | Performed by: INTERNAL MEDICINE

## 2025-03-11 NOTE — PROGRESS NOTES
Subjective:       Patient ID: Megan Godoy is a 89 y.o. female.    Chief Complaint:  Pancreatic mass    HPI  89 y.o. female with past medical history of CHF, AFib, dementia, anemia and hypertension.  Patient presented to ED with jaundice in March 20, 2025 scans revealed sludge stone intra and extrahepatic biliary dilatation was transferred to [place] from [facility].  Bilirubin was up to 19.5 Patient underwent ERCP which was stented fine-needle biopsy done by endoscopies criteria T3 N0 pathology returned as adenocarcinoma  Review of Systems    Patient is slumped in wheelchair brought in by the  from [facility]  Her demographics and medication list accompanied with her the  had no information on patient's medical history   We called nursing home the nurse reported that she was verbal and good communicate which was not the case, we have spent a large amount of time on the phone trying to get information on next of kin and on her medical directives as pt is not communicating in a meaningful manner at my exam. She has advanced dementia  Oncology History:  Pancreatic cancer as above    No past medical history on file.  Past Surgical History:   Procedure Laterality Date    ENDOSCOPIC ULTRASOUND OF UPPER GASTROINTESTINAL TRACT N/A 2/28/2025    Procedure: ULTRASOUND, UPPER GI TRACT, ENDOSCOPIC;  Surgeon: Goyo Mai MD;  Location: [facility] ENDO;  Service: Endoscopy;  Laterality: N/A;    ERCP N/A 2/28/2025    Procedure: ERCP (ENDOSCOPIC RETROGRADE CHOLANGIOPANCREATOGRAPHY);  Surgeon: Goyo Mai MD;  Location: [facility] ENDO;  Service: Endoscopy;  Laterality: N/A;     No family history on file.   Social History     Socioeconomic History    Marital status:    Tobacco Use    Smoking status: Unknown   Substance and Sexual Activity    Alcohol use: Never     Social Drivers of Health     Financial Resource Strain: Patient Unable To Answer (2/28/2025)    Overall  "Financial Resource Strain (CARDIA)     Difficulty of Paying Living Expenses: Patient unable to answer   Food Insecurity: Patient Unable To Answer (2/28/2025)    Hunger Vital Sign     Worried About Running Out of Food in the Last Year: Patient unable to answer     Ran Out of Food in the Last Year: Patient unable to answer   Physical Activity: Unknown (2/28/2025)    Exercise Vital Sign     Days of Exercise per Week: 0 days     Minutes of Exercise per Session: Patient unable to answer   Stress: Patient Unable To Answer (2/28/2025)    [employer] - Occupational Stress Questionnaire     Feeling of Stress : Patient unable to answer   Housing Stability: Patient Unable To Answer (2/28/2025)    Housing Stability Vital Sign     Unable to Pay for Housing in the Last Year: Patient unable to answer     Homeless in the Last Year: Patient unable to answer     Review of patient's allergies indicates:  No Known Allergies  Current Medications[1]    Physical Exam    Demented and elderly woman slumped in wheelchair with foot drop wheelchair had no foot rests. Foot dragging on floor, she has an ulcer on her pinkie tos due to this   Patient is at high risk for fall with her posture.( Review PT and admit notes from hospital stay which elude to this, inspite of this pt was placed in wheel chair not strapped and sent here to [facility] without responsible party who can participate in discussion on behalf of pt with me.  No nursing staff in attend from [facility] patient was by herself in her wheelchair in exam room  Patient answers 1 sentence "I want to go to bed "for every question I ask  I asked her if she was  in pain she said I want to go to bed  I asked her if she knows where she is  I asked  if she understands her medical situation  I asked to her name and her address   Her answer to all of the above was "I want to go to bed"  Patient clearly exhibits dementia    VITAL SIGNS:  as above   GENERAL:  frail and " elderly   No anxiety, no agitation, and in no distress.  Patient is awake, non combative   HEENT:  Arcus senilis present Showed no congestion. Trachea is central +cterus no pallor noted no hoarseness. no obvious JVD   NECK:  Supple.  No JVD. No obvious cervical submental or supraclavicular adenopathy.  RS:the visualized portion of  Chest expands well. chest appears symmetric, no audible wheezes.  No dyspnea recognized  ABDOMEN:  abdomen appears undistended.  EXTREMITIES:  Without edema.  Foot drop noted   SKIN MUSCULOSKELETAL: no joint or skeletal deformity, no clubbing of nails.  No visible rash ecchymosis or petechiae    Records of hospital stay in [facility] reviwed  .patient had voiced pain before there is also comment from previous medical records  that no hx can be obtained from pt due to dementia  Lab Results   Component Value Date    WBC 6.96 03/02/2025    HGB 10.0 (L) 03/02/2025    HCT 28.9 (L) 03/02/2025    MCV 83 03/02/2025     03/02/2025       BMP  Lab Results   Component Value Date     03/02/2025    K 3.7 03/02/2025     03/02/2025    CO2 24 03/02/2025    BUN 13 03/02/2025    CREATININE 0.7 03/02/2025    CALCIUM 8.8 03/02/2025    ANIONGAP 12 03/02/2025           Problem List[2]     Assessment and Plan     Pancreatic adenocarcinoma CA status post biliary obstruction and stent placement bilirubin has come down from 19.5-9.7  Very unfortunate situation patient is noncommunicative she has advanced dementia  Per records brought in from [facility] there are 3 phone numbers listed Tg Godoy granddaughter Abigail Mayer daughter all 3 numbers were called with no answer   Very difficult interaction with [facility] today attempting to reach the director of nursing after nearly a 30 minute attempt I managed to speak with the nursing director who did not seem to know the patient in much detail.  The nurse who reported the patient was completely able to communicate does not seem to be  really a good caregiver considering that this patient was in this state when she arrived here.  This patient should have been in a gurney during transportation for fall prevention she was in a wheelchair without a foot rest with feet dragging.throughout her transport.  She was brought in without any information and she is unable to communicate.  And when we called the nursing home they communicated with us that they usa patient who was communicative which is not the case so unless this was a medical emergency no one in the nursing home knows this patient well.  I have alerted my clinic supervisor to report neglect as there is no way to care for this patient who has a new diagnosed pancreatic cancer being sent to a clinic in such a way  At this point except for monitoring labs I do not know how we can proceed in an 89-year-old who has no quality of life at this time with workup or surgery but this has to be explained to the family and I am waiting to talk to them.  She is also according to her medication list on Xarelto 15 mg tablet given by cardiologist which will put her at even higher risk for fatal bleed if she fell off this wheelchair    Congestive heart failure/pacemaker;    Diabetes mellitus do not see medications listed    Dyslipidemia on atorvastatin    Hypertension on benazepril patient is a resident of nursing home by the tube take vitals and monitor  MDM includes  :    - Acute or chronic illness or injury that poses a threat to life or bodily function  - Independent review and explanation of 3+ results from unique tests    - Extensive discussion of neglect elderly care with management  Attempting to contact family members    Rody Chun will be reported for quality issues.           [1]   Current Outpatient Medications:     albuterol-ipratropium (DUO-NEB) 2.5 mg-0.5 mg/3 mL nebulizer solution, Take 3 mLs by nebulization every 6 (six) hours as needed for Shortness of Breath., Disp: , Rfl:      atorvastatin (LIPITOR) 10 MG tablet, Take 10 mg by mouth once daily., Disp: , Rfl:     benazepriL (LOTENSIN) 20 MG tablet, Take 20 mg by mouth once daily., Disp: , Rfl:     metoprolol succinate (TOPROL-XL) 25 MG 24 hr tablet, Take 25 mg by mouth once daily., Disp: , Rfl:     pantoprazole (PROTONIX) 40 MG tablet, Take 40 mg by mouth once daily., Disp: , Rfl:     pregabalin (LYRICA) 50 MG capsule, Take 50 mg by mouth 3 (three) times daily., Disp: , Rfl:     sertraline (ZOLOFT) 25 MG tablet, Take 25 mg by mouth once daily., Disp: , Rfl:     XARELTO 15 mg Tab, Take 15 mg by mouth once daily., Disp: , Rfl:   [2]   Patient Active Problem List  Diagnosis    Biliary obstruction    Dementia    Type 2 diabetes mellitus    (HFpEF) heart failure with preserved ejection fraction    HTN (hypertension)    A-fib    Pancreatic mass

## 2025-03-11 NOTE — Clinical Note
Please call all 3 numbers on patient's family members give them my cell phone number so I can communicate

## 2025-03-11 NOTE — TELEPHONE ENCOUNTER
"Pt arrived from [facility] with a  that is non-medical. Pt is alert to self only by name, GCS 12-E4, S3, M5. Pt is hunched over in a wheelchair, on a juliana lift pad, with no foot rests and foot drop/feet dragging. The  reports  that he is just here to drop off pt and take her back home. Pt has demographic information and medical information in a manila folder only. Pt is able to say her name and that she wants to go to bed with every question that is asked. I promptly called the nursing home after the assessment and made Dr Che aware. Whomever answered the phone would not tell me her name but stated that the pt was actually verbal and could communicate "just fine." When I said that is different than the notes I was reading in the chart about the pt and different than the state in which she arrived so now it needed to be clarified if this was a medical emergency secondary to her GCS and mental state, she quickly said "well maybe not, hold on," and placed me on another hold. Dr Che asked to talk with the director of nursing. When they came back on the phone, I asked for that individual and was placed on another hold. After a 10 minute wait, they asked if they could have a callback number. When they called back I was connected to the "Director of Nursing," I got Dr Che and she began to explain the situation. She was stopped by this individual because she was "just the nurse on the floor." We requested again to speak with the Director of nursing. They placed us on another hold. "Shaina Director of Nursing" came on the phone and Dr Che inquired about the pt's mental status but also told her that we will be filing a formal complaint against the facility for elder neglect due to these reasons. Manager Birgit Crawford was present for this conversation.      Total time on the phone 42 minutes.     [facility]  is contacted and paperwork initiated for a formal complaint against " [facility].

## 2025-03-12 ENCOUNTER — TELEPHONE (OUTPATIENT)
Dept: HEMATOLOGY/ONCOLOGY | Facility: CLINIC | Age: OVER 89
End: 2025-03-12
Payer: MEDICARE

## 2025-03-12 NOTE — TELEPHONE ENCOUNTER
Attempted to call facility three times now. All three times, the phone continued to ring with no VM and no one answered. Will continue to try.     ----- Message from Kelly sent at 3/11/2025  4:50 PM CDT -----  Pt facility calling to reschedule an appointment due to the fact they want to have the granddaughter in the next appointment.Pt [phone] [place]

## 2025-03-24 ENCOUNTER — HOSPITAL ENCOUNTER (INPATIENT)
Facility: HOSPITAL | Age: OVER 89
LOS: 1 days | Discharge: HOSPICE | DRG: 445 | End: 2025-03-26
Attending: EMERGENCY MEDICINE
Payer: MEDICARE

## 2025-03-24 DIAGNOSIS — R07.9 CHEST PAIN: ICD-10-CM

## 2025-03-24 DIAGNOSIS — K83.1 BILIARY OBSTRUCTION: ICD-10-CM

## 2025-03-24 DIAGNOSIS — C25.9 MALIGNANT NEOPLASM OF PANCREAS, UNSPECIFIED LOCATION OF MALIGNANCY: Primary | ICD-10-CM

## 2025-03-24 DIAGNOSIS — I50.30 HEART FAILURE WITH PRESERVED EJECTION FRACTION, UNSPECIFIED HF CHRONICITY: ICD-10-CM

## 2025-03-24 DIAGNOSIS — F03.90 DEMENTIA, UNSPECIFIED DEMENTIA SEVERITY, UNSPECIFIED DEMENTIA TYPE, UNSPECIFIED WHETHER BEHAVIORAL, PSYCHOTIC, OR MOOD DISTURBANCE OR ANXIETY: ICD-10-CM

## 2025-03-24 DIAGNOSIS — K81.9 CHOLECYSTITIS: ICD-10-CM

## 2025-03-24 PROBLEM — K81.0 ACUTE CHOLECYSTITIS: Status: ACTIVE | Noted: 2025-03-24

## 2025-03-24 LAB
ABSOLUTE EOSINOPHIL (SMH): 0.04 K/UL
ABSOLUTE MONOCYTE (SMH): 1.5 K/UL (ref 0.3–1)
ABSOLUTE NEUTROPHIL COUNT (SMH): 21.5 K/UL (ref 1.8–7.7)
ALBUMIN SERPL-MCNC: 2.7 G/DL (ref 3.5–5.2)
ALP SERPL-CCNC: 142 UNIT/L (ref 55–135)
ALT SERPL-CCNC: 27 UNIT/L (ref 10–44)
ANION GAP (SMH): 8 MMOL/L (ref 8–16)
AST SERPL-CCNC: 32 UNIT/L (ref 10–40)
BACTERIA #/AREA URNS AUTO: ABNORMAL /HPF
BASOPHILS # BLD AUTO: 0.09 K/UL
BASOPHILS NFR BLD AUTO: 0.3 %
BILIRUB SERPL-MCNC: 3.8 MG/DL (ref 0.1–1)
BILIRUB UR QL STRIP.AUTO: ABNORMAL
BUN SERPL-MCNC: 23 MG/DL (ref 8–23)
CALCIUM SERPL-MCNC: 9.2 MG/DL (ref 8.7–10.5)
CHLORIDE SERPL-SCNC: 100 MMOL/L (ref 95–110)
CLARITY UR: ABNORMAL
CO2 SERPL-SCNC: 30 MMOL/L (ref 23–29)
COLOR UR AUTO: YELLOW
CREAT SERPL-MCNC: 0.6 MG/DL (ref 0.5–1.4)
ERYTHROCYTE [DISTWIDTH] IN BLOOD BY AUTOMATED COUNT: 16.2 % (ref 11.5–14.5)
GFR SERPLBLD CREATININE-BSD FMLA CKD-EPI: >60 ML/MIN/1.73/M2
GLUCOSE SERPL-MCNC: 119 MG/DL (ref 70–110)
GLUCOSE UR QL STRIP: NEGATIVE
HCT VFR BLD AUTO: 36.6 % (ref 37–48.5)
HCV AB SERPL QL IA: NORMAL
HGB BLD-MCNC: 11.8 GM/DL (ref 12–16)
HGB UR QL STRIP: ABNORMAL
HIV 1+2 AB+HIV1 P24 AG SERPL QL IA: NORMAL
IMM GRANULOCYTES # BLD AUTO: 0.34 K/UL (ref 0–0.04)
IMM GRANULOCYTES NFR BLD AUTO: 1.3 % (ref 0–0.5)
KETONES UR QL STRIP: NEGATIVE
LEUKOCYTE ESTERASE UR QL STRIP: ABNORMAL
LIPASE SERPL-CCNC: <3 U/L (ref 4–60)
LYMPHOCYTES # BLD AUTO: 2.9 K/UL (ref 1–4.8)
MCH RBC QN AUTO: 29.4 PG (ref 27–31)
MCHC RBC AUTO-ENTMCNC: 32.2 G/DL (ref 32–36)
MCV RBC AUTO: 91 FL (ref 82–98)
MICROSCOPIC COMMENT: ABNORMAL
NITRITE UR QL STRIP: NEGATIVE
NON-SQ EPI CELLS #/AREA URNS AUTO: 2 /HPF
NUCLEATED RBC (/100WBC) (SMH): 0 /100 WBC
PH UR STRIP: 6 [PH]
PLATELET # BLD AUTO: 480 K/UL (ref 150–450)
PMV BLD AUTO: 10.5 FL (ref 9.2–12.9)
POTASSIUM SERPL-SCNC: 3.8 MMOL/L (ref 3.5–5.1)
PROT SERPL-MCNC: 7.9 GM/DL (ref 6–8.4)
PROT UR QL STRIP: ABNORMAL
RBC # BLD AUTO: 4.01 M/UL (ref 4–5.4)
RBC #/AREA URNS AUTO: 10 /HPF
RELATIVE EOSINOPHIL (SMH): 0.2 % (ref 0–8)
RELATIVE LYMPHOCYTE (SMH): 11 % (ref 18–48)
RELATIVE MONOCYTE (SMH): 5.7 % (ref 4–15)
RELATIVE NEUTROPHIL (SMH): 81.5 % (ref 38–73)
SODIUM SERPL-SCNC: 138 MMOL/L (ref 136–145)
SP GR UR STRIP: >=1.03
SQUAMOUS #/AREA URNS AUTO: 1 /HPF
UROBILINOGEN UR STRIP-ACNC: ABNORMAL EU/DL
WBC # BLD AUTO: 26.32 K/UL (ref 3.9–12.7)
WBC #/AREA URNS AUTO: >100 /HPF
WBC CLUMPS UR QL AUTO: ABNORMAL

## 2025-03-24 PROCEDURE — 81001 URINALYSIS AUTO W/SCOPE: CPT | Performed by: EMERGENCY MEDICINE

## 2025-03-24 PROCEDURE — 63600175 PHARM REV CODE 636 W HCPCS: Performed by: NURSE PRACTITIONER

## 2025-03-24 PROCEDURE — G0378 HOSPITAL OBSERVATION PER HR: HCPCS

## 2025-03-24 PROCEDURE — 25500020 PHARM REV CODE 255: Performed by: EMERGENCY MEDICINE

## 2025-03-24 PROCEDURE — 86803 HEPATITIS C AB TEST: CPT | Performed by: EMERGENCY MEDICINE

## 2025-03-24 PROCEDURE — 87086 URINE CULTURE/COLONY COUNT: CPT | Performed by: EMERGENCY MEDICINE

## 2025-03-24 PROCEDURE — 94799 UNLISTED PULMONARY SVC/PX: CPT

## 2025-03-24 PROCEDURE — 85025 COMPLETE CBC W/AUTO DIFF WBC: CPT | Performed by: EMERGENCY MEDICINE

## 2025-03-24 PROCEDURE — 87389 HIV-1 AG W/HIV-1&-2 AB AG IA: CPT | Performed by: EMERGENCY MEDICINE

## 2025-03-24 PROCEDURE — 25000003 PHARM REV CODE 250: Performed by: NURSE PRACTITIONER

## 2025-03-24 PROCEDURE — 83690 ASSAY OF LIPASE: CPT | Performed by: EMERGENCY MEDICINE

## 2025-03-24 PROCEDURE — 99900035 HC TECH TIME PER 15 MIN (STAT)

## 2025-03-24 PROCEDURE — 94761 N-INVAS EAR/PLS OXIMETRY MLT: CPT

## 2025-03-24 PROCEDURE — 80053 COMPREHEN METABOLIC PANEL: CPT | Performed by: EMERGENCY MEDICINE

## 2025-03-24 PROCEDURE — 63600175 PHARM REV CODE 636 W HCPCS: Performed by: EMERGENCY MEDICINE

## 2025-03-24 PROCEDURE — 25000003 PHARM REV CODE 250: Performed by: INTERNAL MEDICINE

## 2025-03-24 PROCEDURE — 96372 THER/PROPH/DIAG INJ SC/IM: CPT | Performed by: NURSE PRACTITIONER

## 2025-03-24 RX ORDER — ONDANSETRON HYDROCHLORIDE 2 MG/ML
4 INJECTION, SOLUTION INTRAVENOUS EVERY 6 HOURS PRN
Status: DISCONTINUED | OUTPATIENT
Start: 2025-03-24 | End: 2025-03-26 | Stop reason: HOSPADM

## 2025-03-24 RX ORDER — ATORVASTATIN CALCIUM 10 MG/1
10 TABLET, FILM COATED ORAL DAILY
Status: DISCONTINUED | OUTPATIENT
Start: 2025-03-25 | End: 2025-03-24

## 2025-03-24 RX ORDER — MORPHINE SULFATE 2 MG/ML
2 INJECTION, SOLUTION INTRAMUSCULAR; INTRAVENOUS EVERY 4 HOURS PRN
Refills: 0 | Status: DISCONTINUED | OUTPATIENT
Start: 2025-03-24 | End: 2025-03-26 | Stop reason: HOSPADM

## 2025-03-24 RX ORDER — MORPHINE SULFATE 4 MG/ML
4 INJECTION, SOLUTION INTRAMUSCULAR; INTRAVENOUS EVERY 4 HOURS PRN
Refills: 0 | Status: DISCONTINUED | OUTPATIENT
Start: 2025-03-24 | End: 2025-03-26 | Stop reason: HOSPADM

## 2025-03-24 RX ORDER — HEPARIN SODIUM 5000 [USP'U]/ML
5000 INJECTION, SOLUTION INTRAVENOUS; SUBCUTANEOUS EVERY 8 HOURS
Status: DISCONTINUED | OUTPATIENT
Start: 2025-03-24 | End: 2025-03-26 | Stop reason: HOSPADM

## 2025-03-24 RX ORDER — PANTOPRAZOLE SODIUM 40 MG/1
40 TABLET, DELAYED RELEASE ORAL DAILY
Status: DISCONTINUED | OUTPATIENT
Start: 2025-03-25 | End: 2025-03-26 | Stop reason: HOSPADM

## 2025-03-24 RX ORDER — NALOXONE HCL 0.4 MG/ML
0.02 VIAL (ML) INJECTION
Status: DISCONTINUED | OUTPATIENT
Start: 2025-03-24 | End: 2025-03-26 | Stop reason: HOSPADM

## 2025-03-24 RX ORDER — IPRATROPIUM BROMIDE AND ALBUTEROL SULFATE 2.5; .5 MG/3ML; MG/3ML
3 SOLUTION RESPIRATORY (INHALATION) EVERY 6 HOURS PRN
Status: DISCONTINUED | OUTPATIENT
Start: 2025-03-24 | End: 2025-03-26 | Stop reason: HOSPADM

## 2025-03-24 RX ORDER — GLUCAGON 1 MG
1 KIT INJECTION
Status: DISCONTINUED | OUTPATIENT
Start: 2025-03-24 | End: 2025-03-26 | Stop reason: HOSPADM

## 2025-03-24 RX ORDER — SODIUM CHLORIDE 9 MG/ML
INJECTION, SOLUTION INTRAVENOUS CONTINUOUS
Status: DISCONTINUED | OUTPATIENT
Start: 2025-03-24 | End: 2025-03-26 | Stop reason: HOSPADM

## 2025-03-24 RX ORDER — MORPHINE SULFATE 4 MG/ML
3 INJECTION, SOLUTION INTRAMUSCULAR; INTRAVENOUS
Status: COMPLETED | OUTPATIENT
Start: 2025-03-24 | End: 2025-03-24

## 2025-03-24 RX ORDER — LISINOPRIL 20 MG/1
20 TABLET ORAL DAILY
Status: DISCONTINUED | OUTPATIENT
Start: 2025-03-25 | End: 2025-03-24

## 2025-03-24 RX ORDER — TALC
6 POWDER (GRAM) TOPICAL NIGHTLY PRN
Status: DISCONTINUED | OUTPATIENT
Start: 2025-03-24 | End: 2025-03-26 | Stop reason: HOSPADM

## 2025-03-24 RX ORDER — SERTRALINE HYDROCHLORIDE 25 MG/1
25 TABLET, FILM COATED ORAL DAILY
Status: DISCONTINUED | OUTPATIENT
Start: 2025-03-25 | End: 2025-03-26 | Stop reason: HOSPADM

## 2025-03-24 RX ORDER — SODIUM,POTASSIUM PHOSPHATES 280-250MG
2 POWDER IN PACKET (EA) ORAL
Status: DISCONTINUED | OUTPATIENT
Start: 2025-03-24 | End: 2025-03-26 | Stop reason: HOSPADM

## 2025-03-24 RX ORDER — ACETAMINOPHEN 325 MG/1
650 TABLET ORAL EVERY 8 HOURS PRN
Status: DISCONTINUED | OUTPATIENT
Start: 2025-03-24 | End: 2025-03-26 | Stop reason: HOSPADM

## 2025-03-24 RX ORDER — IBUPROFEN 200 MG
24 TABLET ORAL
Status: DISCONTINUED | OUTPATIENT
Start: 2025-03-24 | End: 2025-03-26 | Stop reason: HOSPADM

## 2025-03-24 RX ORDER — METOPROLOL SUCCINATE 25 MG/1
25 TABLET, EXTENDED RELEASE ORAL DAILY
Status: DISCONTINUED | OUTPATIENT
Start: 2025-03-25 | End: 2025-03-26 | Stop reason: HOSPADM

## 2025-03-24 RX ORDER — PREGABALIN 25 MG/1
50 CAPSULE ORAL 3 TIMES DAILY
Status: DISCONTINUED | OUTPATIENT
Start: 2025-03-24 | End: 2025-03-26 | Stop reason: HOSPADM

## 2025-03-24 RX ORDER — LANOLIN ALCOHOL/MO/W.PET/CERES
800 CREAM (GRAM) TOPICAL
Status: DISCONTINUED | OUTPATIENT
Start: 2025-03-24 | End: 2025-03-26 | Stop reason: HOSPADM

## 2025-03-24 RX ORDER — ALUMINUM HYDROXIDE, MAGNESIUM HYDROXIDE, AND SIMETHICONE 1200; 120; 1200 MG/30ML; MG/30ML; MG/30ML
30 SUSPENSION ORAL 4 TIMES DAILY PRN
Status: DISCONTINUED | OUTPATIENT
Start: 2025-03-24 | End: 2025-03-26 | Stop reason: HOSPADM

## 2025-03-24 RX ORDER — SODIUM CHLORIDE 0.9 % (FLUSH) 0.9 %
2 SYRINGE (ML) INJECTION
Status: DISCONTINUED | OUTPATIENT
Start: 2025-03-24 | End: 2025-03-26 | Stop reason: HOSPADM

## 2025-03-24 RX ORDER — IBUPROFEN 200 MG
16 TABLET ORAL
Status: DISCONTINUED | OUTPATIENT
Start: 2025-03-24 | End: 2025-03-26 | Stop reason: HOSPADM

## 2025-03-24 RX ORDER — ACETAMINOPHEN 325 MG/1
650 TABLET ORAL EVERY 4 HOURS PRN
Status: DISCONTINUED | OUTPATIENT
Start: 2025-03-24 | End: 2025-03-26 | Stop reason: HOSPADM

## 2025-03-24 RX ADMIN — SODIUM CHLORIDE: 9 INJECTION, SOLUTION INTRAVENOUS at 09:03

## 2025-03-24 RX ADMIN — PREGABALIN 50 MG: 25 CAPSULE ORAL at 09:03

## 2025-03-24 RX ADMIN — PIPERACILLIN AND TAZOBACTAM 4.5 G: 4; .5 INJECTION, POWDER, LYOPHILIZED, FOR SOLUTION INTRAVENOUS at 07:03

## 2025-03-24 RX ADMIN — IOHEXOL 100 ML: 350 INJECTION, SOLUTION INTRAVENOUS at 05:03

## 2025-03-24 RX ADMIN — HEPARIN SODIUM 5000 UNITS: 5000 INJECTION INTRAVENOUS; SUBCUTANEOUS at 09:03

## 2025-03-24 RX ADMIN — MORPHINE SULFATE 3 MG: 4 INJECTION INTRAVENOUS at 04:03

## 2025-03-24 NOTE — ED PROVIDER NOTES
Chief complaint:  Abdominal Pain (Patient sent from GI office for eval of abdominal pain. Patient has hx of pancreatic cancer with stent placement for obstructive jaundice on feb 28th. )      HPI:  Megan Godoy is a 89 y.o. female with hx CHF, atrial fibrillation, advanced dementia, anemia, htn, atrial fibrillation on rivaroxaban, COPD, and pancreatic adenocarcinoma on bx with ERCP s/p stent for biliary obstruction 2/28/25 (around 1 month prior) presenting with abdominal pain emphasized in the epigastric area by patient.  She is a limited historian with severe dementia.  She is unable to attest to frequency, duration, or other character of pain.  She does not voluntary or other associated symptoms such as emesis or fever.  She reportedly was seen in Gastroenterology office today and there is a written note recommending she come to the emergency department for hospital admission with further consideration of enrolling patient in hospice has been previously recommended on review of EMR due to patient's severe dementia and known malignancy.  She may have limited outside family or other support with note made of report to adult protective services on previous hematology/oncology visit earlier this month.    ROS: As per HPI and below:  Limited.  No chest pain or dyspnea.  No difficulty urinating or blood in the stools.  No fever.    Review of patient's allergies indicates:  No Known Allergies    Patient's Medications   New Prescriptions    No medications on file   Previous Medications    ALBUTEROL-IPRATROPIUM (DUO-NEB) 2.5 MG-0.5 MG/3 ML NEBULIZER SOLUTION    Take 3 mLs by nebulization every 6 (six) hours as needed for Shortness of Breath.    ATORVASTATIN (LIPITOR) 10 MG TABLET    Take 10 mg by mouth once daily.    BENAZEPRIL (LOTENSIN) 20 MG TABLET    Take 20 mg by mouth once daily.    METOPROLOL SUCCINATE (TOPROL-XL) 25 MG 24 HR TABLET    Take 25 mg by mouth once daily.    PANTOPRAZOLE (PROTONIX) 40 MG TABLET     Take 40 mg by mouth once daily.    PREGABALIN (LYRICA) 50 MG CAPSULE    Take 50 mg by mouth 3 (three) times daily.    SERTRALINE (ZOLOFT) 25 MG TABLET    Take 25 mg by mouth once daily.    XARELTO 15 MG TAB    Take 15 mg by mouth once daily.   Modified Medications    No medications on file   Discontinued Medications    No medications on file       PMH:  As per HPI and below:  History reviewed. No pertinent past medical history.  Past Surgical History:   Procedure Laterality Date    ENDOSCOPIC ULTRASOUND OF UPPER GASTROINTESTINAL TRACT N/A 2/28/2025    Procedure: ULTRASOUND, UPPER GI TRACT, ENDOSCOPIC;  Surgeon: Goyo Mai MD;  Location: [facility] ENDO;  Service: Endoscopy;  Laterality: N/A;    ERCP N/A 2/28/2025    Procedure: ERCP (ENDOSCOPIC RETROGRADE CHOLANGIOPANCREATOGRAPHY);  Surgeon: Goyo Mai MD;  Location: [facility] ENDO;  Service: Endoscopy;  Laterality: N/A;       Social History     Socioeconomic History    Marital status:    Tobacco Use    Smoking status: Unknown   Substance and Sexual Activity    Alcohol use: Never     Social Drivers of Health     Financial Resource Strain: Patient Unable To Answer (2/28/2025)    Overall Financial Resource Strain (CARDIA)     Difficulty of Paying Living Expenses: Patient unable to answer   Food Insecurity: Patient Unable To Answer (2/28/2025)    Hunger Vital Sign     Worried About Running Out of Food in the Last Year: Patient unable to answer     Ran Out of Food in the Last Year: Patient unable to answer   Physical Activity: Unknown (2/28/2025)    Exercise Vital Sign     Days of Exercise per Week: 0 days     Minutes of Exercise per Session: Patient unable to answer   Stress: Patient Unable To Answer (2/28/2025)    [employer] - Occupational Stress Questionnaire     Feeling of Stress : Patient unable to answer   Housing Stability: Patient Unable To Answer (2/28/2025)    Housing Stability Vital Sign     Unable to Pay for Housing in the  Last Year: Patient unable to answer     Homeless in the Last Year: Patient unable to answer       No family history on file.    Physical Exam:    Vitals:    03/24/25 1906   BP:    Pulse: 97   Resp: 16   Temp:      GENERAL:  No apparent distress.  Alert.    HEENT:  Moist mucous membranes.  Normocephalic and atraumatic.    NECK:  No swelling.  Midline trachea.   CARDIOVASCULAR:  Regular rate and rhythm.  2+ radial pulses.    PULMONARY:  Lungs clear to auscultation bilaterally.  No wheezes, rales, or rhonci.    ABDOMEN:  Diffuse abdominal tenderness more prominent upper with some distention.  No palpable hernia or mass.  EXTREMITIES:  Warm and well perfused.  Brisk capillary refill.  No edema.  NEUROLOGICAL:  Normal mental status.  Appropriate and conversant.    SKIN:  No rashes or ecchymoses.    BACK:  Atraumatic.  No CVA tenderness to palpation.      Labs Reviewed   COMPREHENSIVE METABOLIC PANEL - Abnormal       Result Value    Sodium 138      Potassium 3.8      Chloride 100      CO2 30 (*)     Glucose 119 (*)     BUN 23      Creatinine 0.6      Calcium 9.2      Protein Total 7.9      Albumin 2.7 (*)     Bilirubin Total 3.8 (*)      (*)     AST 32      ALT 27      Anion Gap 8      eGFR >60     LIPASE - Abnormal    Lipase Level <3 (*)    CBC WITH DIFFERENTIAL - Abnormal    WBC 26.32 (*)     RBC 4.01      Hgb 11.8 (*)     Hct 36.6 (*)     MCV 91      MCH 29.4      MCHC 32.2      RDW 16.2 (*)     Platelet Count 480 (*)     MPV 10.5      Nucleated RBC 0      Neut % 81.5 (*)     Lymph % 11.0 (*)     Mono % 5.7      Eos % 0.2      Basophil % 0.3      Imm Grans % 1.3 (*)     Neut # 21.5 (*)     Lymph # 2.90      Mono # 1.50 (*)     Eos # 0.04      Baso # 0.09      Imm Grans # 0.34 (*)    URINALYSIS, REFLEX TO URINE CULTURE - Abnormal    Color, UA Yellow      Appearance, UA Hazy (*)     Spec Grav UA >=1.030 (*)     pH, UA 6.0      Protein, UA 2+ (*)     Glucose, UA Negative      Ketones, UA Negative      Blood, UA 1+  (*)     Bilirubin, UA 1+ (*)     Urobilinogen, UA 4.0-6.0 (*)     Nitrites, UA Negative      Leukocyte Esterase, UA 3+ (*)    URINALYSIS MICROSCOPIC - Abnormal    RBC, UA 10 (*)     WBC, UA >100 (*)     WBC Clumps, UA Moderate (*)     Bacteria, UA Few (*)     Squamous Epithelial Cells, UA 1      Non-Squamous Epithelial Cells 2 (*)     Microscopic Comment       CULTURE, URINE   CBC W/ AUTO DIFFERENTIAL    Narrative:     The following orders were created for panel order CBC auto differential.  Procedure                               Abnormality         Status                     ---------                               -----------         ------                     CBC with Differential[[id number]]       Abnormal            Final result                 Please view results for these tests on the individual orders.   HEPATITIS C ANTIBODY   HIV 1 / 2 ANTIBODY       Current Discharge Medication List        CONTINUE these medications which have NOT CHANGED    Details   albuterol-ipratropium (DUO-NEB) 2.5 mg-0.5 mg/3 mL nebulizer solution Take 3 mLs by nebulization every 6 (six) hours as needed for Shortness of Breath.      atorvastatin (LIPITOR) 10 MG tablet Take 10 mg by mouth once daily.      benazepriL (LOTENSIN) 20 MG tablet Take 20 mg by mouth once daily.      metoprolol succinate (TOPROL-XL) 25 MG 24 hr tablet Take 25 mg by mouth once daily.      pantoprazole (PROTONIX) 40 MG tablet Take 40 mg by mouth once daily.      pregabalin (LYRICA) 50 MG capsule Take 50 mg by mouth 3 (three) times daily.      sertraline (ZOLOFT) 25 MG tablet Take 25 mg by mouth once daily.      XARELTO 15 mg Tab Take 15 mg by mouth once daily.             Orders Placed This Encounter   Procedures    Urine culture    CT Abdomen Pelvis With IV Contrast NO Oral Contrast    X-Ray Chest 1 View    US Abdomen Limited    Hepatitis C Antibody    HIV 1/2 Ag/Ab (4th Gen)    CBC auto differential    Comprehensive metabolic panel    Lipase    CBC with  Differential    Urinalysis, Reflex to Urine Culture    Comprehensive Metabolic Panel (CMP)    Magnesium    CBC with Automated Differential    Urinalysis Microscopic    Diet NPO    Vital signs    Bladder scan    Notify Physician    Place sequential compression device    Recheck Blood Glucose:    Cardiac Monitoring - Adult    Full code    Inpatient consult to General Surgery    Inpatient consult to Palliative Care    Inpatient consult to Social Work    EKG 12-lead    Insert Saline lock IV    Saline lock IV    Place in Observation    Fall precautions       Imaging Results               US Abdomen Limited (Final result)  Result time 03/24/25 19:42:12      Final result by Ganesh Pittman MD (03/24/25 19:42:12)                   Impression:      Sludge and gallstone packed gallbladder with marked gallbladder wall distension, gallbladder wall irregularity, and gallbladder wall thickening.  Sonographic Torres sign is reportedly negative.  Underlying cholecystitis not excluded.  Please refer to prior CT report.    Improved biliary duct dilatation post common bile duct stent.  Pancreas is obscured by overlying bowel gas.    This report was flagged in Epic as abnormal.      Electronically signed by: Ganesh Pittman MD  Date:    03/24/2025  Time:    19:42               Narrative:    EXAMINATION:  US ABDOMEN LIMITED    CLINICAL HISTORY:  Abdominal pain, r/o cholecystitis;.    TECHNIQUE:  Limited ultrasound of the right upper quadrant of the abdomen including pancreas, liver, gallbladder, common bile duct was performed.    COMPARISON:  CT, 03/24/2025.  Ultrasound, 02/27/2025.    FINDINGS:  Exam quality is limited by overlying bowel gas shadowing, and patient clinical status.  Limited patient mobility and inability to breath hold also limits evaluation.    Liver: Homogeneous echotexture. No focal hepatic lesions.    Gallbladder: Gallbladder is markedly distended and contains innumerable small gallstones and is packed with  "biliary sludge.  There is diffuse gallbladder wall thickening and gallbladder wall irregularity with focal outpouchings of the gallbladder wall, potentially related to gallbladder distension or gallbladder perforation.  Small volume pericholecystic fluid.  Sonographic Torres sign is reportedly negative.    Biliary system: The common duct is mildly distended, measuring 9 mm (previously 12 mm).  Interval placement of biliary stent.  Minimal intrahepatic biliary duct dilatation and scattered pneumobilia.    Pancreas: Obscured by overlying bowel gas.    Miscellaneous: Trace free fluid.  Limited evaluation of the right kidney is negative for hydronephrosis.                                       X-Ray Chest 1 View (Final result)  Result time 03/24/25 18:18:10      Final result by Ganesh Pittman MD (03/24/25 18:18:10)                   Impression:      Cardiomegaly with mild interstitial edema.  No large focal consolidation identified on this limited single view.      Electronically signed by: Ganesh Pittman MD  Date:    03/24/2025  Time:    18:18               Narrative:    EXAMINATION:  XR CHEST 1 VIEW    CLINICAL HISTORY:  Provided history is "Leukocytosis, r/o pna;  ".    TECHNIQUE:  One view of the chest.    COMPARISON:  11/25/2024.    FINDINGS:  Cardiac silhouette is mildly enlarged.  Atherosclerotic calcifications overlie the aortic arch.  Left chest wall cardiac pacing device is present with transvenous leads overlying the heart.  Coarse interstitial lung markings without large focal area of consolidation.  No large pleural effusion.  No distinct pneumothorax.  Biliary stent overlying the upper abdomen.  IV contrast in the collecting system in the upper abdomen from recent CT scan.                                        CT Abdomen Pelvis With IV Contrast NO Oral Contrast (Final result)  Result time 03/24/25 17:57:28      Final result by Ganesh Pittman MD (03/24/25 17:57:28)                   Impression: "      Markedly distended gallbladder with gallbladder wall thickening, pericholecystic edema, and small volume free fluid adjacent to the gallbladder.  Underlying cholecystitis and/or gallbladder wall perforation included in the differential.  Recommend correlation with symptoms in this patient with provided history of nonlocalized acute abdominal pain, as there is no clinical history available in the chart at the time of dictation.    Interval placement of common bile duct stent with intra and extrahepatic biliary duct dilatation, though improved from prior study.  Dilated pancreatic duct and probable pancreatic head mass.  Recommend correlation with recent ERCP and EUS.  Correlation with tissue sampling also advised.    Moderate stool burden in the rectum with mild presacral edema and trace pelvic free fluid.  Suggest correlation for constipation or stercoral colitis.    Similar subcentimeter hepatic hypodensities.  Suggest continued attention on follow-up in this patient with suspected pancreatic head mass.    Motion and artifact limited study related to suboptimal patient positioning.    Additional findings discussed in the body of the report.    This report was flagged in Epic as abnormal.      Electronically signed by: Ganesh Pittman MD  Date:    03/24/2025  Time:    17:57               Narrative:    EXAMINATION:  CT ABDOMEN PELVIS WITH IV CONTRAST    CLINICAL HISTORY:  Abdominal pain, acute, nonlocalized;    TECHNIQUE:  Low dose axial images, sagittal and coronal reformations were obtained from the lung bases to the pubic symphysis following the IV administration of 100 mL of Omnipaque 350 .  Oral contrast was not given.    COMPARISON:  CT abdomen pelvis, 02/27/2025.    FINDINGS:  Lower Chest:    Coarse bibasilar interstitial lung markings.  Heart is mildly enlarged.  Enlargement of the right atrium and right ventricle as seen previously.  Partially imaged cardiac pacing device.  No consolidation or pleural  effusion.  No pericardial effusion.  Evaluation of the lower chest limited by motion.    Abdomen:    Evaluation is limited by extensive streak artifact due to the patient's arms overlying the field of view.  Exam quality also limited by motion.    Liver is similar in size and contour.  Similar subcentimeter hypodensities in the liver, too small to definitively characterize.  Intra and extrahepatic biliary duct dilatation, though improved from prior study, with new pneumobilia and new common bile duct stent.  Gallbladder is markedly distended, more distended when compared with recent prior study.  There is probable gallbladder wall thickening with pericholecystic fluid.  New suspected hypoattenuating fluid in the central liver adjacent to the gallbladder (axial image 77).  Trace free fluid adjacent to the gallbladder.  Gallstones suspected within the gallbladder, as well as biliary sludge.    Spleen is not enlarged.  Adrenal glands are stable and negative for acute finding.  Pancreas is atrophic and suboptimally delineated.  There is probable pancreatic duct dilatation.  There is a probable pancreatic mass in the pancreatic head measuring roughly 1.5 cm in size (axial image 91).  Suggest correlation with recent ERCP and endoscopic ultrasound.    The kidneys are symmetric.  No hydronephrosis. No asymmetric perinephric fat stranding.    No small bowel obstruction.  Mild stool burden in the rectum.  Few colonic diverticula.  No evidence of acute diverticulitis.    No pneumoperitoneum or organized fluid collection.    No bulky retroperitoneal lymphadenopathy.  Subcentimeter peripancreatic lymph nodes.    Abdominal aorta is similar in course and caliber with moderate calcific atherosclerosis.  Similar probable stenosis of the proximal celiac artery, with patent splenic and hepatic arteries.    Portal vein is suboptimally opacified and therefore patency of the portal venous system cannot be confirmed.  Narrowing of the  portosplenic confluence better evaluated on prior CT secondary to differences in bolus timing.    Pelvis:    Urinary bladder is decompressed with symmetric wall thickening.  This could be correlated with urinalysis if there are symptoms of urinary tract infection.  Moderate stool burden in the rectum.  Mild presacral edema.  Trace pelvic free fluid.  Uterus is likely absent.    Bones and soft tissues:    No aggressive osseous lesions.  Moderate to advanced degenerative changes in the lower lumbar spine.  Minimal scoliotic curvature of the spine.  Mild right lateral listhesis of L3 on L4.  Mild degenerative changes in both hips.  Mild diffuse body wall edema.  Mild diastasis recti with small fat containing ventral abdominal hernia as seen previously.                                      ED Course as of 03/24/25 2012   Mon Mar 24, 2025   1757 CXR:  NAD. (Independently interpreted by me) [MR]   1816 D/w Dr. Priest re: probably cholecystitis of uncertain chronicity who recommends admit, IV abx, NPO, hold anticoagulation.  Pending care goals would consider intervention such as cholecystostomy tube. [MR]      ED Course User Index  [MR] Roman Barillas MD       MDM:    89 y.o. female with primarily upper abdominal pain in the setting of known pancreatic malignancy sent from GI office for consideration of admission with hospice.  Patient is a limited historian in his not able to participate in decision-making or history.  Repeat studies ordered here for hepatic and pancreatic function with repeat imaging ordered to exclude other acute, life-threatening process pending planning admission.  I have low suspicion for new process.  Analgesia ordered here.      Workup notable for possible cholecystitis of uncertain chronicity seen both on CT and ultrasound.  I have discussed with General surgery who will follow the patient with recommendation initially for IV antibiotics and NPO status with temporary discontinuation  of anticoagulation.  Depending on treatment goals given known malignancy, she may be better suited to either no immediate intervention or cholecystostomy tube rather than cholecystectomy.  Family is not available for discussion of care goals at this time.  I have discussed with hospital medicine as well who will admit.    Diagnoses:    1. Abdominal pain, generalized  2. Cholecystitis       Roman Barillas MD  03/24/25 2012

## 2025-03-25 PROBLEM — Z71.89 ACP (ADVANCE CARE PLANNING): Status: ACTIVE | Noted: 2025-03-25

## 2025-03-25 PROBLEM — Z71.89 GOALS OF CARE, COUNSELING/DISCUSSION: Status: ACTIVE | Noted: 2025-03-25

## 2025-03-25 LAB
ABSOLUTE EOSINOPHIL (SMH): 0.1 K/UL
ABSOLUTE MONOCYTE (SMH): 1.16 K/UL (ref 0.3–1)
ABSOLUTE NEUTROPHIL COUNT (SMH): 13.6 K/UL (ref 1.8–7.7)
ALBUMIN SERPL-MCNC: 2.6 G/DL (ref 3.5–5.2)
ALP SERPL-CCNC: 128 UNIT/L (ref 55–135)
ALT SERPL-CCNC: 22 UNIT/L (ref 10–44)
ANION GAP (SMH): 9 MMOL/L (ref 8–16)
AST SERPL-CCNC: 26 UNIT/L (ref 10–40)
BASOPHILS # BLD AUTO: 0.05 K/UL
BASOPHILS NFR BLD AUTO: 0.3 %
BILIRUB SERPL-MCNC: 3.4 MG/DL (ref 0.1–1)
BUN SERPL-MCNC: 22 MG/DL (ref 8–23)
CALCIUM SERPL-MCNC: 8.8 MG/DL (ref 8.7–10.5)
CHLORIDE SERPL-SCNC: 102 MMOL/L (ref 95–110)
CO2 SERPL-SCNC: 27 MMOL/L (ref 23–29)
CREAT SERPL-MCNC: 0.6 MG/DL (ref 0.5–1.4)
ERYTHROCYTE [DISTWIDTH] IN BLOOD BY AUTOMATED COUNT: 16.6 % (ref 11.5–14.5)
GFR SERPLBLD CREATININE-BSD FMLA CKD-EPI: >60 ML/MIN/1.73/M2
GLUCOSE SERPL-MCNC: 102 MG/DL (ref 70–110)
HCT VFR BLD AUTO: 34 % (ref 37–48.5)
HGB BLD-MCNC: 10.5 GM/DL (ref 12–16)
IMM GRANULOCYTES # BLD AUTO: 0.17 K/UL (ref 0–0.04)
IMM GRANULOCYTES NFR BLD AUTO: 1 % (ref 0–0.5)
INR PPP: 1.3 (ref 0.8–1.2)
LYMPHOCYTES # BLD AUTO: 1.98 K/UL (ref 1–4.8)
MAGNESIUM SERPL-MCNC: 1.8 MG/DL (ref 1.6–2.6)
MCH RBC QN AUTO: 28.5 PG (ref 27–31)
MCHC RBC AUTO-ENTMCNC: 30.9 G/DL (ref 32–36)
MCV RBC AUTO: 92 FL (ref 82–98)
NUCLEATED RBC (/100WBC) (SMH): 0 /100 WBC
PLATELET # BLD AUTO: 456 K/UL (ref 150–450)
PMV BLD AUTO: 10.5 FL (ref 9.2–12.9)
POTASSIUM SERPL-SCNC: 3.4 MMOL/L (ref 3.5–5.1)
PROT SERPL-MCNC: 7.1 GM/DL (ref 6–8.4)
PROTHROMBIN TIME: 13.9 SECONDS (ref 9–12.5)
RBC # BLD AUTO: 3.68 M/UL (ref 4–5.4)
RELATIVE EOSINOPHIL (SMH): 0.6 % (ref 0–8)
RELATIVE LYMPHOCYTE (SMH): 11.6 % (ref 18–48)
RELATIVE MONOCYTE (SMH): 6.8 % (ref 4–15)
RELATIVE NEUTROPHIL (SMH): 79.7 % (ref 38–73)
SODIUM SERPL-SCNC: 138 MMOL/L (ref 136–145)
WBC # BLD AUTO: 17.05 K/UL (ref 3.9–12.7)

## 2025-03-25 PROCEDURE — 96372 THER/PROPH/DIAG INJ SC/IM: CPT | Performed by: NURSE PRACTITIONER

## 2025-03-25 PROCEDURE — 84450 TRANSFERASE (AST) (SGOT): CPT | Performed by: NURSE PRACTITIONER

## 2025-03-25 PROCEDURE — 94761 N-INVAS EAR/PLS OXIMETRY MLT: CPT

## 2025-03-25 PROCEDURE — 85025 COMPLETE CBC W/AUTO DIFF WBC: CPT | Performed by: NURSE PRACTITIONER

## 2025-03-25 PROCEDURE — 63600175 PHARM REV CODE 636 W HCPCS: Performed by: NURSE PRACTITIONER

## 2025-03-25 PROCEDURE — 83735 ASSAY OF MAGNESIUM: CPT | Performed by: NURSE PRACTITIONER

## 2025-03-25 PROCEDURE — 25000003 PHARM REV CODE 250: Performed by: NURSE PRACTITIONER

## 2025-03-25 PROCEDURE — 12000002 HC ACUTE/MED SURGE SEMI-PRIVATE ROOM

## 2025-03-25 PROCEDURE — 85610 PROTHROMBIN TIME: CPT

## 2025-03-25 PROCEDURE — 36415 COLL VENOUS BLD VENIPUNCTURE: CPT | Performed by: NURSE PRACTITIONER

## 2025-03-25 PROCEDURE — 99900035 HC TECH TIME PER 15 MIN (STAT)

## 2025-03-25 PROCEDURE — 36415 COLL VENOUS BLD VENIPUNCTURE: CPT | Mod: 59

## 2025-03-25 RX ADMIN — PIPERACILLIN AND TAZOBACTAM 4.5 G: 4; .5 INJECTION, POWDER, LYOPHILIZED, FOR SOLUTION INTRAVENOUS at 11:03

## 2025-03-25 RX ADMIN — HEPARIN SODIUM 5000 UNITS: 5000 INJECTION INTRAVENOUS; SUBCUTANEOUS at 09:03

## 2025-03-25 RX ADMIN — PIPERACILLIN AND TAZOBACTAM 4.5 G: 4; .5 INJECTION, POWDER, LYOPHILIZED, FOR SOLUTION INTRAVENOUS at 08:03

## 2025-03-25 RX ADMIN — HEPARIN SODIUM 5000 UNITS: 5000 INJECTION INTRAVENOUS; SUBCUTANEOUS at 05:03

## 2025-03-25 RX ADMIN — PIPERACILLIN AND TAZOBACTAM 4.5 G: 4; .5 INJECTION, POWDER, LYOPHILIZED, FOR SOLUTION INTRAVENOUS at 03:03

## 2025-03-25 RX ADMIN — ACETAMINOPHEN 650 MG: 325 TABLET ORAL at 09:03

## 2025-03-25 RX ADMIN — PREGABALIN 50 MG: 25 CAPSULE ORAL at 08:03

## 2025-03-25 RX ADMIN — MORPHINE SULFATE 2 MG: 2 INJECTION, SOLUTION INTRAMUSCULAR; INTRAVENOUS at 05:03

## 2025-03-25 RX ADMIN — HEPARIN SODIUM 5000 UNITS: 5000 INJECTION INTRAVENOUS; SUBCUTANEOUS at 01:03

## 2025-03-25 NOTE — ASSESSMENT & PLAN NOTE
NPO  Hold Xarelto start heparin  Consult general surgery  Consult GI  Continue IV Zosyn  Pain control  Attempt to get in touch with family  Consult social work for help with patient care  Consult palliative

## 2025-03-25 NOTE — SUBJECTIVE & OBJECTIVE
Interval History: Pt seen today for palliative medicine consult for goals of care discussion.    History reviewed. No pertinent past medical history.    Past Surgical History:   Procedure Laterality Date    ENDOSCOPIC ULTRASOUND OF UPPER GASTROINTESTINAL TRACT N/A 2/28/2025    Procedure: ULTRASOUND, UPPER GI TRACT, ENDOSCOPIC;  Surgeon: Goyo Mai MD;  Location: [facility];  Service: Endoscopy;  Laterality: N/A;    ERCP N/A 2/28/2025    Procedure: ERCP (ENDOSCOPIC RETROGRADE CHOLANGIOPANCREATOGRAPHY);  Surgeon: Goyo Mai MD;  Location: [facility];  Service: Endoscopy;  Laterality: N/A;       Review of patient's allergies indicates:  No Known Allergies    Medications:  Continuous Infusions:   0.9% NaCl   Intravenous Continuous 75 mL/hr at 03/25/25 0526 Rate Verify at 03/25/25 0526     Scheduled Meds:   heparin (porcine)  5,000 Units Subcutaneous Q8H    metoprolol succinate  25 mg Oral Daily    pantoprazole  40 mg Oral Daily    piperacillin-tazobactam (Zosyn) IV (PEDS and ADULTS) (extended infusion is not appropriate)  4.5 g Intravenous Q8H    pregabalin  50 mg Oral TID    sertraline  25 mg Oral Daily     PRN Meds:  Current Facility-Administered Medications:     acetaminophen, 650 mg, Oral, Q8H PRN    acetaminophen, 650 mg, Oral, Q4H PRN    albuterol-ipratropium, 3 mL, Nebulization, Q6H PRN    aluminum-magnesium hydroxide-simethicone, 30 mL, Oral, QID PRN    dextrose 50%, 12.5 g, Intravenous, PRN    dextrose 50%, 25 g, Intravenous, PRN    glucagon (human recombinant), 1 mg, Intramuscular, PRN    glucose, 16 g, Oral, PRN    glucose, 24 g, Oral, PRN    magnesium oxide, 800 mg, Oral, PRN    magnesium oxide, 800 mg, Oral, PRN    melatonin, 6 mg, Oral, Nightly PRN    morphine, 2 mg, Intravenous, Q4H PRN    morphine, 4 mg, Intravenous, Q4H PRN    naloxone, 0.02 mg, Intravenous, PRN    ondansetron, 4 mg, Intravenous, Q6H PRN    potassium bicarbonate, 35 mEq, Oral, PRN    potassium bicarbonate, 50 mEq, Oral, PRN     potassium bicarbonate, 60 mEq, Oral, PRN    potassium, sodium phosphates, 2 packet, Oral, PRN    potassium, sodium phosphates, 2 packet, Oral, PRN    potassium, sodium phosphates, 2 packet, Oral, PRN    sodium chloride 0.9%, 2 mL, Intravenous, PRN    Family History    None       Tobacco Use    Smoking status: Unknown    Smokeless tobacco: Not on file   Substance and Sexual Activity    Alcohol use: Never    Drug use: Not on file    Sexual activity: Not on file       Review of Systems   Unable to perform ROS: Dementia     Objective:     Vital Signs (Most Recent):  Temp: 98 °F (36.7 °C) (03/25/25 1132)  Pulse: 110 (03/25/25 1132)  Resp: 17 (03/25/25 1132)  BP: 111/72 (03/25/25 1132)  SpO2: 96 % (03/25/25 1132) Vital Signs (24h Range):  Temp:  [97.9 °F (36.6 °C)-98.4 °F (36.9 °C)] 98 °F (36.7 °C)  Pulse:  [] 110  Resp:  [15-20] 17  SpO2:  [94 %-100 %] 96 %  BP: ()/(62-80) 111/72     Weight: 46.7 kg (102 lb 14.4 oz)  Body mass index is 18.82 kg/m².       Physical Exam  Vitals and nursing note reviewed.   Constitutional:       General: She is not in acute distress.     Appearance: She is ill-appearing.   HENT:      Mouth/Throat:      Mouth: Mucous membranes are moist.      Pharynx: Oropharynx is clear.   Eyes:      General: No scleral icterus.     Pupils: Pupils are equal, round, and reactive to light.   Cardiovascular:      Rate and Rhythm: Normal rate and regular rhythm.      Pulses: Normal pulses.   Pulmonary:      Effort: Pulmonary effort is normal. No respiratory distress.   Abdominal:      General: There is no distension.      Palpations: Abdomen is soft.      Tenderness: There is abdominal tenderness (RUQ tenderness).   Musculoskeletal:         General: No swelling.      Comments: Bilat foot drop, muscle wasting   Skin:     General: Skin is warm and dry.   Neurological:      Mental Status: She is alert. Mental status is at baseline. She is disoriented.            Review of Symptoms      Symptom  Assessment (ESAS 0-10 Scale)  Unable to complete assessment due to Dementia         Pain Assessment in Advanced Demential Scale (PAINAD)   Breathing - Independent of vocalization:  0  Negative vocalization:  1  Facial expression:  2  Body language:  0  Consolability:  0  Total:  3    Performance Status:  30    Living Arrangements:  Lives in nursing home    Psychosocial/Cultural:   See Palliative Psychosocial Note: No  **Primary  to Follow**  Palliative Care  Consult: No        Advance Care Planning   Advance Directives:   Living Will: No    Do Not Resuscitate Status: Yes    Medical Power of : No      Decision Making:  Family answered questions  Goals of Care: The family endorses that what is most important right now is to focus on spending time at home, symptom/pain control, and comfort and QOL     Accordingly, we have decided that the best plan to meet the patient's goals includes enrolling in hospice care.         Significant Labs: All pertinent labs within the past 24 hours have been reviewed.  CBC:   Recent Labs   Lab 03/25/25  0623   WBC 17.05*   HGB 10.5*   HCT 34.0*   MCV 92   *     BMP:  Recent Labs   Lab 03/25/25  0623      K 3.4*   CO2 27   BUN 22   CREATININE 0.6   CALCIUM 8.8   MG 1.8     LFT:  Lab Results   Component Value Date    AST 26 03/25/2025    ALKPHOS 128 03/25/2025    BILITOT 3.4 (H) 03/25/2025     Albumin:   Albumin   Date Value Ref Range Status   03/25/2025 2.6 (L) 3.5 - 5.2 g/dL Final     Protein:   Total Protein   Date Value Ref Range Status   03/02/2025 6.6 6.0 - 8.4 g/dL Final     Lactic acid:   Lab Results   Component Value Date    LACTATE 1.2 02/27/2025       Significant Imaging: I have reviewed all pertinent imaging results/findings within the past 24 hours.

## 2025-03-25 NOTE — ASSESSMENT & PLAN NOTE
Patient with dementia with likely etiology of vascular dementia. Dementia is severe. The patient does not have signs of behavioral disturbance. Home dementia medications are Held or Continued:  None prescribed. Continue non-pharmacologic interventions to prevent delirium (No VS between 11PM-5AM, activity during day, opening blinds, providing glasses/hearing aids, and up in chair during daytime). Will avoid narcotics and benzos unless absolutely necessary. PRN anti-psychotics are not prescribed to avoid self harm behaviors.

## 2025-03-25 NOTE — SUBJECTIVE & OBJECTIVE
History reviewed. No pertinent past medical history.    Past Surgical History:   Procedure Laterality Date    ENDOSCOPIC ULTRASOUND OF UPPER GASTROINTESTINAL TRACT N/A 2/28/2025    Procedure: ULTRASOUND, UPPER GI TRACT, ENDOSCOPIC;  Surgeon: Goyo Mai MD;  Location: [facility] ENDO;  Service: Endoscopy;  Laterality: N/A;    ERCP N/A 2/28/2025    Procedure: ERCP (ENDOSCOPIC RETROGRADE CHOLANGIOPANCREATOGRAPHY);  Surgeon: Goyo Mai MD;  Location: [facility] ENDO;  Service: Endoscopy;  Laterality: N/A;       Review of patient's allergies indicates:  No Known Allergies    No current facility-administered medications on file prior to encounter.     Current Outpatient Medications on File Prior to Encounter   Medication Sig    albuterol-ipratropium (DUO-NEB) 2.5 mg-0.5 mg/3 mL nebulizer solution Take 3 mLs by nebulization every 6 (six) hours as needed for Shortness of Breath.    atorvastatin (LIPITOR) 10 MG tablet Take 10 mg by mouth once daily.    benazepriL (LOTENSIN) 20 MG tablet Take 20 mg by mouth once daily.    metoprolol succinate (TOPROL-XL) 25 MG 24 hr tablet Take 25 mg by mouth once daily.    pantoprazole (PROTONIX) 40 MG tablet Take 40 mg by mouth once daily.    pregabalin (LYRICA) 50 MG capsule Take 50 mg by mouth 3 (three) times daily.    sertraline (ZOLOFT) 25 MG tablet Take 25 mg by mouth once daily.    XARELTO 15 mg Tab Take 15 mg by mouth once daily.     Family History    None       Tobacco Use    Smoking status: Unknown    Smokeless tobacco: Not on file   Substance and Sexual Activity    Alcohol use: Never    Drug use: Not on file    Sexual activity: Not on file     Review of Systems   Unable to perform ROS: Dementia     Objective:     Vital Signs (Most Recent):  Temp: 98.4 °F (36.9 °C) (03/24/25 1428)  Pulse: 99 (03/24/25 1808)  Resp: 16 (03/24/25 1808)  BP: 133/80 (03/24/25 1803)  SpO2: 96 % (03/24/25 1659) Vital Signs (24h Range):  Temp:  [98.4 °F (36.9 °C)] 98.4 °F (36.9 °C)  Pulse:  []  99  Resp:  [15-20] 16  SpO2:  [96 %-100 %] 96 %  BP: ()/(62-80) 133/80     Weight: 45.4 kg (100 lb)  Body mass index is 18.29 kg/m².     Physical Exam  Vitals reviewed.   Constitutional:       General: She is awake. She is not in acute distress.     Appearance: Normal appearance. She is not toxic-appearing.   HENT:      Head: Normocephalic and atraumatic.      Mouth/Throat:      Mouth: Mucous membranes are moist.      Pharynx: Oropharynx is clear.   Eyes:      Extraocular Movements: Extraocular movements intact.      Pupils: Pupils are equal, round, and reactive to light.   Cardiovascular:      Rate and Rhythm: Normal rate and regular rhythm.      Pulses: Normal pulses.      Heart sounds: Normal heart sounds.   Pulmonary:      Effort: Pulmonary effort is normal.      Breath sounds: Normal breath sounds.   Abdominal:      General: Bowel sounds are normal. There is no distension.      Palpations: Abdomen is soft.      Tenderness: There is abdominal tenderness.   Musculoskeletal:         General: No swelling. Normal range of motion.      Cervical back: Normal range of motion and neck supple.   Skin:     General: Skin is warm and dry.      Capillary Refill: Capillary refill takes less than 2 seconds.      Comments: Vitiligo   Neurological:      General: No focal deficit present.      Mental Status: She is alert. Mental status is at baseline.   Psychiatric:      Comments: Unable to evaluate              CRANIAL NERVES     CN III, IV, VI   Pupils are equal, round, and reactive to light.       Significant Labs: All pertinent labs within the past 24 hours have been reviewed.  Recent Lab Results         03/24/25  1543        Albumin 2.7              ALT 27       Anion Gap 8       AST 32       Baso # 0.09       Basophil % 0.3       BILIRUBIN TOTAL 3.8  Comment: For infants and newborns, interpretation of results should be based   on gestational age, weight and in agreement with clinical  "  observations.    Premature Infant recommended reference ranges:   0-24 hours:  <8.0 mg/dL   24-48 hours: <12.0 mg/dL   3-5 days:    <15.0 mg/dL   6-29 days:   <15.0 mg/dL       BUN 23       Calcium 9.2       Chloride 100       CO2 30       Creatinine 0.6       eGFR >60       Eos # 0.04       Eos % 0.2       Glucose 119       Hematocrit 36.6       Hemoglobin 11.8       Immature Grans (Abs) 0.34  Comment: Mild elevation in immature granulocytes is non specific and can be seen in a variety of conditions including stress response, acute inflammation, trauma and pregnancy. Correlation with other laboratory and clinical findings is essential.       Immature Granulocytes 1.3       Lipase <3       Lymph # 2.90       LYMPH % 11.0       MCH 29.4       MCHC 32.2       MCV 91       Mono # 1.50       Mono % 5.7       MPV 10.5       Neut # 21.5       Neut % 81.5       nRBC 0       Platelet Count 480       Potassium 3.8       PROTEIN TOTAL 7.9       RBC 4.01       RDW 16.2       Sodium 138       WBC 26.32               Significant Imaging: I have reviewed all pertinent imaging results/findings within the past 24 hours.    X-Ray Chest 1 View  Narrative: EXAMINATION:  XR CHEST 1 VIEW    CLINICAL HISTORY:  Provided history is "Leukocytosis, r/o pna;  ".    TECHNIQUE:  One view of the chest.    COMPARISON:  11/25/2024.    FINDINGS:  Cardiac silhouette is mildly enlarged.  Atherosclerotic calcifications overlie the aortic arch.  Left chest wall cardiac pacing device is present with transvenous leads overlying the heart.  Coarse interstitial lung markings without large focal area of consolidation.  No large pleural effusion.  No distinct pneumothorax.  Biliary stent overlying the upper abdomen.  IV contrast in the collecting system in the upper abdomen from recent CT scan.  Impression: Cardiomegaly with mild interstitial edema.  No large focal consolidation identified on this limited single view.    Electronically signed by: Ganesh " MD Toya  Date:    03/24/2025  Time:    18:18  CT Abdomen Pelvis With IV Contrast NO Oral Contrast  Narrative: EXAMINATION:  CT ABDOMEN PELVIS WITH IV CONTRAST    CLINICAL HISTORY:  Abdominal pain, acute, nonlocalized;    TECHNIQUE:  Low dose axial images, sagittal and coronal reformations were obtained from the lung bases to the pubic symphysis following the IV administration of 100 mL of Omnipaque 350 .  Oral contrast was not given.    COMPARISON:  CT abdomen pelvis, 02/27/2025.    FINDINGS:  Lower Chest:    Coarse bibasilar interstitial lung markings.  Heart is mildly enlarged.  Enlargement of the right atrium and right ventricle as seen previously.  Partially imaged cardiac pacing device.  No consolidation or pleural effusion.  No pericardial effusion.  Evaluation of the lower chest limited by motion.    Abdomen:    Evaluation is limited by extensive streak artifact due to the patient's arms overlying the field of view.  Exam quality also limited by motion.    Liver is similar in size and contour.  Similar subcentimeter hypodensities in the liver, too small to definitively characterize.  Intra and extrahepatic biliary duct dilatation, though improved from prior study, with new pneumobilia and new common bile duct stent.  Gallbladder is markedly distended, more distended when compared with recent prior study.  There is probable gallbladder wall thickening with pericholecystic fluid.  New suspected hypoattenuating fluid in the central liver adjacent to the gallbladder (axial image 77).  Trace free fluid adjacent to the gallbladder.  Gallstones suspected within the gallbladder, as well as biliary sludge.    Spleen is not enlarged.  Adrenal glands are stable and negative for acute finding.  Pancreas is atrophic and suboptimally delineated.  There is probable pancreatic duct dilatation.  There is a probable pancreatic mass in the pancreatic head measuring roughly 1.5 cm in size (axial image 91).  Suggest  correlation with recent ERCP and endoscopic ultrasound.    The kidneys are symmetric.  No hydronephrosis. No asymmetric perinephric fat stranding.    No small bowel obstruction.  Mild stool burden in the rectum.  Few colonic diverticula.  No evidence of acute diverticulitis.    No pneumoperitoneum or organized fluid collection.    No bulky retroperitoneal lymphadenopathy.  Subcentimeter peripancreatic lymph nodes.    Abdominal aorta is similar in course and caliber with moderate calcific atherosclerosis.  Similar probable stenosis of the proximal celiac artery, with patent splenic and hepatic arteries.    Portal vein is suboptimally opacified and therefore patency of the portal venous system cannot be confirmed.  Narrowing of the portosplenic confluence better evaluated on prior CT secondary to differences in bolus timing.    Pelvis:    Urinary bladder is decompressed with symmetric wall thickening.  This could be correlated with urinalysis if there are symptoms of urinary tract infection.  Moderate stool burden in the rectum.  Mild presacral edema.  Trace pelvic free fluid.  Uterus is likely absent.    Bones and soft tissues:    No aggressive osseous lesions.  Moderate to advanced degenerative changes in the lower lumbar spine.  Minimal scoliotic curvature of the spine.  Mild right lateral listhesis of L3 on L4.  Mild degenerative changes in both hips.  Mild diffuse body wall edema.  Mild diastasis recti with small fat containing ventral abdominal hernia as seen previously.  Impression: Markedly distended gallbladder with gallbladder wall thickening, pericholecystic edema, and small volume free fluid adjacent to the gallbladder.  Underlying cholecystitis and/or gallbladder wall perforation included in the differential.  Recommend correlation with symptoms in this patient with provided history of nonlocalized acute abdominal pain, as there is no clinical history available in the chart at the time of  dictation.    Interval placement of common bile duct stent with intra and extrahepatic biliary duct dilatation, though improved from prior study.  Dilated pancreatic duct and probable pancreatic head mass.  Recommend correlation with recent ERCP and EUS.  Correlation with tissue sampling also advised.    Moderate stool burden in the rectum with mild presacral edema and trace pelvic free fluid.  Suggest correlation for constipation or stercoral colitis.    Similar subcentimeter hepatic hypodensities.  Suggest continued attention on follow-up in this patient with suspected pancreatic head mass.    Motion and artifact limited study related to suboptimal patient positioning.    Additional findings discussed in the body of the report.    This report was flagged in Epic as abnormal.    Electronically signed by: Ganesh Pittman MD  Date:    03/24/2025  Time:    17:57

## 2025-03-25 NOTE — PLAN OF CARE
BRENDA informed that pt's family want to proceed with hospice services at [facility], having no preference of hospice provider.    BRENDA sent referral to Les via [employer] and notified Yany via telephone.     03/25/25 1542   Discharge Reassessment   Assessment Type Discharge Planning Reassessment   Did the patient's condition or plan change since previous assessment? Yes   Discharge Plan A Return to nursing home;Hospice/home   Post-Acute Status   Post-Acute Authorization Hospice   Hospice Status Referrals Sent   Discharge Delays (!) Post-Acute Set-up

## 2025-03-25 NOTE — ASSESSMENT & PLAN NOTE
I reviewed the patient's chart and discussed the case with the patient's team.      I examined Megan Godoy at bedside.  The patient lacks capacity for complex medical decision-making. No family present at . I spoke with pts Granddaughter Young Godoy via telephone.      I introduced myself and my role as palliative care NP. She was agreeable to speaking.    We discussed the patient's medical illness, prognosis, and values in detail.  Below is a brief summary of our discussion.     She has a good understanding of patient's current medical issues.  We spoke back and forth about her newly diagnosed pancreatic cancer, cholelithiasis, advanced dementia and debilitated state.    We discussed prognosis.  Per recent oncology notes patient is not a candidate for chemotherapy given her advanced dementia and performance status.  Her family understands this and they do not desire to pursue cancer treatment.  I let them know that if patient continues on her current trajectory I would not be surprised if she  in the coming weeks or months.  She was not surprised by this news.    We discussed her values.  Patient is bed-bound and lives at [facility].  She has advanced dementia and they feel that her quality life is poor.  They do not want to see her suffer or go through any further surgeries or interventions.  They want to focus on her comfort and quality of life.    I did use this opportunity to discuss hospice and the philosophy of hospice care. Ultimately, hospice is about living as well as you can, as long as you can, given the circumstances of your illness. When a complication occurs, the goal will be to provide relief of suffering and allowing the patients to die naturally. This generally does not involve 911, EMS, and repeat hospitalizations.     Family's goals for patient are aligned with hospice philosophy.  They would like for patient to return back to [facility] with hospice  services.    I updated patient's medical team and case management.    I appreciate being involved in patient's care.  I will sign off.  Please reach out if I can be of any assistance.

## 2025-03-25 NOTE — CONSULTS
[facility]  Palliative Medicine  Consult Note    Patient Name: Megan Godoy  MRN: [id number]  Admission Date: 3/24/2025  Hospital Length of Stay: 0 days  Code Status: DNR   Attending Provider: Sonu Truong, *  Consulting Provider: Shaina Cho NP  Primary Care Physician: Yamileth, Primary Doctor  Principal Problem:Acute cholecystitis    Patient information was obtained from relative(s), past medical records, ER records, and primary team.      Inpatient consult to Palliative Care  Consult performed by: Shaina Cho, NP  Consult ordered by: Gely Salazar NP        Assessment/Plan:     Palliative Care  Goals of care, counseling/discussion  I reviewed the patient's chart and discussed the case with the patient's team.      I examined Megan Godoy at bedside.  The patient lacks capacity for complex medical decision-making. No family present at BS. I spoke with pts Granddaughter Young Godoy via telephone.      I introduced myself and my role as palliative care NP. She was agreeable to speaking.    We discussed the patient's medical illness, prognosis, and values in detail.  Below is a brief summary of our discussion.     She has a good understanding of patient's current medical issues.  We spoke back and forth about her newly diagnosed pancreatic cancer, cholelithiasis, advanced dementia and debilitated state.    We discussed prognosis.  Per recent oncology notes patient is not a candidate for chemotherapy given her advanced dementia and performance status.  Her family understands this and they do not desire to pursue cancer treatment.  I let them know that if patient continues on her current trajectory I would not be surprised if she  in the coming weeks or months.  She was not surprised by this news.    We discussed her values.  Patient is bed-bound and lives at [facility].  She has advanced dementia and they feel that her quality life is poor.  They do  not want to see her suffer or go through any further surgeries or interventions.  They want to focus on her comfort and quality of life.    I did use this opportunity to discuss hospice and the philosophy of hospice care. Ultimately, hospice is about living as well as you can, as long as you can, given the circumstances of your illness. When a complication occurs, the goal will be to provide relief of suffering and allowing the patients to die naturally. This generally does not involve 911, EMS, and repeat hospitalizations.     Family's goals for patient are aligned with hospice philosophy.  They would like for patient to return back to [facility] with hospice services.    I updated patient's medical team and case management.    I appreciate being involved in patient's care.  I will sign off.  Please reach out if I can be of any assistance.    ACP (advance care planning)  Advance Care Planning    Date: 03/25/2025    Code Status  In light of the patients advanced and life limiting illness,I engaged the the family in a voluntary conversation about the patient's preferences for care  at the very end of life. The patient wishes to have a natural, peaceful death.  Along those lines, the patient does not wish to have CPR or other invasive treatments performed when her heart and/or breathing stops. I communicated to the family that a DNR order would be placed in her medical record to reflect this preference.    [facility]  I engaged the family in a voluntary conversation about advance care planning and we specifically addressed what the goals of care would be moving forward, in light of the patient's change in clinical status, specifically pancreatic adenocarcinoma, cholecystitis, advance dementia, debilitated state.  We did specifically address the patient's likely prognosis, which is poor.  We explored the patient's values and preferences for future care.  The family endorses that what is most important right now is to  focus on spending time at home, symptom/pain control, and comfort and QOL     Accordingly, we have decided that the best plan to meet the patient's goals includes enrolling in hospice care    Hospice  I did explain the role for hospice care at this stage of the patient's illness, including its ability to help the patient live with the best quality of life possible.  We will be making a hospice referral.              Thank you for your consult.       Subjective:     HPI:   From admission HPI: Who is a poor historian she has dementia and does not talk she only moans and groans in response to questions and assessment.  According to the ED patient was sent here by GI for the possibility of hospice with acute cholecystitis.  ED MD spoke with general surgery who will evaluate patient.  Unable to get in contact with patient's family attempted grandson on file. Patient's Xarelto held and started on Heparin.  Patient moans when palpating abdomen.      Triage vitals with pulse 99, /65, temp 98.4°, SpO2 97% on room air.  Blood work with CO2 30, glucose 119, albumin 2.7, total bili 3.8, alk phos 142, WBC 26.3, hemoglobin 11.8, hematocrit 36.6, platelet 480.  Chest x-ray with cardiomegaly with mild interstitial edema.  No large focal consolidation identified on this limited single view.  CT scan abdomen and pelvis with IV contrast markedly distended gallbladder with gallbladder wall thickening pericolic cystitis edema and small volume free fluid adjacent to the gallbladder.  Underlying cholecystitis and/or gallbladder wall perforation included in the differential.  Recommend correlation with symptoms in his patient with provided history of non localized acute abdominal pain, as there is no clinical history available in chart at the time of dictation.  Interval placement of common bile duct stent with intra and extrahepatic biliary duct dilatation.  Dilated pancreatic does not probable pancreatic head mass.  Moderate stool  burden in the rectum and mild presacral edema and trace pelvic free fluid.  Similar subcentimeter hepatic hypodensities.  Motion and artifact limited study.    Palliative medicine consult:  Patient currently admitted and being treated for acute cholecystitis.  She was recently diagnosed with adenocarcinoma of pancreas.  We have been consulted for goals of care discussion.    Hospital Course:  No notes on file    Interval History: Pt seen today for palliative medicine consult for goals of care discussion.    History reviewed. No pertinent past medical history.    Past Surgical History:   Procedure Laterality Date    ENDOSCOPIC ULTRASOUND OF UPPER GASTROINTESTINAL TRACT N/A 2/28/2025    Procedure: ULTRASOUND, UPPER GI TRACT, ENDOSCOPIC;  Surgeon: Goyo Mai MD;  Location: [facility] ENDO;  Service: Endoscopy;  Laterality: N/A;    ERCP N/A 2/28/2025    Procedure: ERCP (ENDOSCOPIC RETROGRADE CHOLANGIOPANCREATOGRAPHY);  Surgeon: Goyo Mai MD;  Location: [facility] ENDO;  Service: Endoscopy;  Laterality: N/A;       Review of patient's allergies indicates:  No Known Allergies    Medications:  Continuous Infusions:   0.9% NaCl   Intravenous Continuous 75 mL/hr at 03/25/25 0526 Rate Verify at 03/25/25 0526     Scheduled Meds:   heparin (porcine)  5,000 Units Subcutaneous Q8H    metoprolol succinate  25 mg Oral Daily    pantoprazole  40 mg Oral Daily    piperacillin-tazobactam (Zosyn) IV (PEDS and ADULTS) (extended infusion is not appropriate)  4.5 g Intravenous Q8H    pregabalin  50 mg Oral TID    sertraline  25 mg Oral Daily     PRN Meds:  Current Facility-Administered Medications:     acetaminophen, 650 mg, Oral, Q8H PRN    acetaminophen, 650 mg, Oral, Q4H PRN    albuterol-ipratropium, 3 mL, Nebulization, Q6H PRN    aluminum-magnesium hydroxide-simethicone, 30 mL, Oral, QID PRN    dextrose 50%, 12.5 g, Intravenous, PRN    dextrose 50%, 25 g, Intravenous, PRN    glucagon (human recombinant), 1 mg, Intramuscular, PRN     glucose, 16 g, Oral, PRN    glucose, 24 g, Oral, PRN    magnesium oxide, 800 mg, Oral, PRN    magnesium oxide, 800 mg, Oral, PRN    melatonin, 6 mg, Oral, Nightly PRN    morphine, 2 mg, Intravenous, Q4H PRN    morphine, 4 mg, Intravenous, Q4H PRN    naloxone, 0.02 mg, Intravenous, PRN    ondansetron, 4 mg, Intravenous, Q6H PRN    potassium bicarbonate, 35 mEq, Oral, PRN    potassium bicarbonate, 50 mEq, Oral, PRN    potassium bicarbonate, 60 mEq, Oral, PRN    potassium, sodium phosphates, 2 packet, Oral, PRN    potassium, sodium phosphates, 2 packet, Oral, PRN    potassium, sodium phosphates, 2 packet, Oral, PRN    sodium chloride 0.9%, 2 mL, Intravenous, PRN    Family History    None       Tobacco Use    Smoking status: Unknown    Smokeless tobacco: Not on file   Substance and Sexual Activity    Alcohol use: Never    Drug use: Not on file    Sexual activity: Not on file       Review of Systems   Unable to perform ROS: Dementia     Objective:     Vital Signs (Most Recent):  Temp: 98 °F (36.7 °C) (03/25/25 1132)  Pulse: 110 (03/25/25 1132)  Resp: 17 (03/25/25 1132)  BP: 111/72 (03/25/25 1132)  SpO2: 96 % (03/25/25 1132) Vital Signs (24h Range):  Temp:  [97.9 °F (36.6 °C)-98.4 °F (36.9 °C)] 98 °F (36.7 °C)  Pulse:  [] 110  Resp:  [15-20] 17  SpO2:  [94 %-100 %] 96 %  BP: ()/(62-80) 111/72     Weight: 46.7 kg (102 lb 14.4 oz)  Body mass index is 18.82 kg/m².       Physical Exam  Vitals and nursing note reviewed.   Constitutional:       General: She is not in acute distress.     Appearance: She is ill-appearing.   HENT:      Mouth/Throat:      Mouth: Mucous membranes are moist.      Pharynx: Oropharynx is clear.   Eyes:      General: No scleral icterus.     Pupils: Pupils are equal, round, and reactive to light.   Cardiovascular:      Rate and Rhythm: Normal rate and regular rhythm.      Pulses: Normal pulses.   Pulmonary:      Effort: Pulmonary effort is normal. No respiratory distress.   Abdominal:       General: There is no distension.      Palpations: Abdomen is soft.      Tenderness: There is abdominal tenderness (RUQ tenderness).   Musculoskeletal:         General: No swelling.      Comments: Bilat foot drop, muscle wasting   Skin:     General: Skin is warm and dry.   Neurological:      Mental Status: She is alert. Mental status is at baseline. She is disoriented.            Review of Symptoms      Symptom Assessment (ESAS 0-10 Scale)  Unable to complete assessment due to Dementia         Pain Assessment in Advanced Demential Scale (PAINAD)   Breathing - Independent of vocalization:  0  Negative vocalization:  1  Facial expression:  2  Body language:  0  Consolability:  0  Total:  3    Performance Status:  30    Living Arrangements:  Lives in nursing home    Psychosocial/Cultural:   See Palliative Psychosocial Note: No  **Primary  to Follow**  Palliative Care  Consult: No        Advance Care Planning  Advance Directives:   Living Will: No    Do Not Resuscitate Status: Yes    Medical Power of : No      Decision Making:  Family answered questions  Goals of Care: The family endorses that what is most important right now is to focus on spending time at home, symptom/pain control, and comfort and QOL     Accordingly, we have decided that the best plan to meet the patient's goals includes enrolling in hospice care.         Significant Labs: All pertinent labs within the past 24 hours have been reviewed.  CBC:   Recent Labs   Lab 03/25/25  0623   WBC 17.05*   HGB 10.5*   HCT 34.0*   MCV 92   *     BMP:  Recent Labs   Lab 03/25/25  0623      K 3.4*   CO2 27   BUN 22   CREATININE 0.6   CALCIUM 8.8   MG 1.8     LFT:  Lab Results   Component Value Date    AST 26 03/25/2025    ALKPHOS 128 03/25/2025    BILITOT 3.4 (H) 03/25/2025     Albumin:   Albumin   Date Value Ref Range Status   03/25/2025 2.6 (L) 3.5 - 5.2 g/dL Final     Protein:   Total Protein   Date Value Ref Range  Status   03/02/2025 6.6 6.0 - 8.4 g/dL Final     Lactic acid:   Lab Results   Component Value Date    LACTATE 1.2 02/27/2025       Significant Imaging: I have reviewed all pertinent imaging results/findings within the past 24 hours.      I spent a total of 75 minutes on the day of the visit. This includes face to face time in discussion of goals of care, symptom assessment, coordination of care and emotional support.  This also includes non-face to face time preparing to see the patient (eg, review of tests/imaging), obtaining and/or reviewing separately obtained history, documenting clinical information in the electronic or other health record, independently interpreting results and communicating results to the patient/family/caregiver, or care coordinator.    I spent an additional 20 minutes discussing advance care planning.     Shaina Cho, NP  Palliative Medicine  [facility]

## 2025-03-25 NOTE — RESPIRATORY THERAPY
03/25/25 0750   Patient Assessment/Suction   Level of Consciousness (AVPU) alert   Respiratory Effort Unlabored   All Lung Fields Breath Sounds clear   PRE-TX-O2   Device (Oxygen Therapy) room air   SpO2 (!) 94 %   Pulse Oximetry Type Intermittent   $ Pulse Oximetry - Multiple Charge Pulse Oximetry - Multiple   Pulse 95   Resp 18   Aerosol Therapy   $ Aerosol Therapy Charges PRN treatment not required

## 2025-03-25 NOTE — PLAN OF CARE
Spoke to Yany with [facility] hospice and confirmed patients demographics as they are verifying insurance. Yany also stated she is having trouble getting in touch with the family but has been in touch with [facility].     03/25/25 154   Post-Acute Status   Post-Acute Authorization Hospice   Hospice Status Referrals Sent   Discharge Delays (!) Post-Acute Set-up   Discharge Plan   Discharge Plan A Hospice/home;Return to nursing home

## 2025-03-25 NOTE — PROGRESS NOTES
Called Granddaughter Young. Has been trying to reach her Mother by telephone but unable. Ms. Vidal is agreeable to a phone call at 1:00pm today with PC Team.

## 2025-03-25 NOTE — ASSESSMENT & PLAN NOTE
"Patient's FSGs are controlled on current medication regimen.  Last A1c reviewed-   Lab Results   Component Value Date    HGBA1C 4.7 02/28/2025     Most recent fingerstick glucose reviewed- No results for input(s): "POCTGLUCOSE" in the last 24 hours.  Current correctional scale  controlled with diet and weight loss  Maintain anti-hyperglycemic dose as follows-   Antihyperglycemics (From admission, onward)      None          Hold Oral hypoglycemics while patient is in the hospital.  "

## 2025-03-25 NOTE — H&P
[facility] [place] - Emergency Dept  Hospital Medicine  History & Physical    Patient Name: Megan Godoy  MRN: [id number]  Patient Class: OP- Observation  Admission Date: 3/24/2025  Attending Physician: Aaron Ann MD   Primary Care Provider: Yamileth Primary Doctor         Patient information was obtained from ER records.     Subjective:     Principal Problem:Acute cholecystitis    Chief Complaint:   Chief Complaint   Patient presents with    Abdominal Pain     Patient sent from GI office for eval of abdominal pain. Patient has hx of pancreatic cancer with stent placement for obstructive jaundice on feb 28th.         HPI: Who is a poor historian she has dementia and does not talk she only moans and groans in response to questions and assessment.  According to the ED patient was sent here by GI for the possibility of hospice with acute cholecystitis.  ED MD spoke with general surgery who will evaluate patient.  Unable to get in contact with patient's family attempted grandson on file. Patient's Xarelto held and started on Heparin.  Patient moans when palpating abdomen.     Triage vitals with pulse 99, /65, temp 98.4°, SpO2 97% on room air.  Blood work with CO2 30, glucose 119, albumin 2.7, total bili 3.8, alk phos 142, WBC 26.3, hemoglobin 11.8, hematocrit 36.6, platelet 480.  Chest x-ray with cardiomegaly with mild interstitial edema.  No large focal consolidation identified on this limited single view.  CT scan abdomen and pelvis with IV contrast markedly distended gallbladder with gallbladder wall thickening pericolic cystitis edema and small volume free fluid adjacent to the gallbladder.  Underlying cholecystitis and/or gallbladder wall perforation included in the differential.  Recommend correlation with symptoms in his patient with provided history of non localized acute abdominal pain, as there is no clinical history available in chart at the time of dictation.  Interval placement of common bile  duct stent with intra and extrahepatic biliary duct dilatation.  Dilated pancreatic does not probable pancreatic head mass.  Moderate stool burden in the rectum and mild presacral edema and trace pelvic free fluid.  Similar subcentimeter hepatic hypodensities.  Motion and artifact limited study.    History reviewed. No pertinent past medical history.    Past Surgical History:   Procedure Laterality Date    ENDOSCOPIC ULTRASOUND OF UPPER GASTROINTESTINAL TRACT N/A 2/28/2025    Procedure: ULTRASOUND, UPPER GI TRACT, ENDOSCOPIC;  Surgeon: Goyo Mai MD;  Location: [facility] ENDO;  Service: Endoscopy;  Laterality: N/A;    ERCP N/A 2/28/2025    Procedure: ERCP (ENDOSCOPIC RETROGRADE CHOLANGIOPANCREATOGRAPHY);  Surgeon: Goyo Mai MD;  Location: [facility] ENDO;  Service: Endoscopy;  Laterality: N/A;       Review of patient's allergies indicates:  No Known Allergies    No current facility-administered medications on file prior to encounter.     Current Outpatient Medications on File Prior to Encounter   Medication Sig    albuterol-ipratropium (DUO-NEB) 2.5 mg-0.5 mg/3 mL nebulizer solution Take 3 mLs by nebulization every 6 (six) hours as needed for Shortness of Breath.    atorvastatin (LIPITOR) 10 MG tablet Take 10 mg by mouth once daily.    benazepriL (LOTENSIN) 20 MG tablet Take 20 mg by mouth once daily.    metoprolol succinate (TOPROL-XL) 25 MG 24 hr tablet Take 25 mg by mouth once daily.    pantoprazole (PROTONIX) 40 MG tablet Take 40 mg by mouth once daily.    pregabalin (LYRICA) 50 MG capsule Take 50 mg by mouth 3 (three) times daily.    sertraline (ZOLOFT) 25 MG tablet Take 25 mg by mouth once daily.    XARELTO 15 mg Tab Take 15 mg by mouth once daily.     Family History    None       Tobacco Use    Smoking status: Unknown    Smokeless tobacco: Not on file   Substance and Sexual Activity    Alcohol use: Never    Drug use: Not on file    Sexual activity: Not on file     Review of Systems   Unable to perform ROS:  Dementia     Objective:     Vital Signs (Most Recent):  Temp: 98.4 °F (36.9 °C) (03/24/25 1428)  Pulse: 99 (03/24/25 1808)  Resp: 16 (03/24/25 1808)  BP: 133/80 (03/24/25 1803)  SpO2: 96 % (03/24/25 1659) Vital Signs (24h Range):  Temp:  [98.4 °F (36.9 °C)] 98.4 °F (36.9 °C)  Pulse:  [] 99  Resp:  [15-20] 16  SpO2:  [96 %-100 %] 96 %  BP: ()/(62-80) 133/80     Weight: 45.4 kg (100 lb)  Body mass index is 18.29 kg/m².     Physical Exam  Vitals reviewed.   Constitutional:       General: She is awake. She is not in acute distress.     Appearance: Normal appearance. She is not toxic-appearing.   HENT:      Head: Normocephalic and atraumatic.      Mouth/Throat:      Mouth: Mucous membranes are moist.      Pharynx: Oropharynx is clear.   Eyes:      Extraocular Movements: Extraocular movements intact.      Pupils: Pupils are equal, round, and reactive to light.   Cardiovascular:      Rate and Rhythm: Normal rate and regular rhythm.      Pulses: Normal pulses.      Heart sounds: Normal heart sounds.   Pulmonary:      Effort: Pulmonary effort is normal.      Breath sounds: Normal breath sounds.   Abdominal:      General: Bowel sounds are normal. There is no distension.      Palpations: Abdomen is soft.      Tenderness: There is abdominal tenderness.   Musculoskeletal:         General: No swelling. Normal range of motion.      Cervical back: Normal range of motion and neck supple.   Skin:     General: Skin is warm and dry.      Capillary Refill: Capillary refill takes less than 2 seconds.      Comments: Vitiligo   Neurological:      General: No focal deficit present.      Mental Status: She is alert. Mental status is at baseline.   Psychiatric:      Comments: Unable to evaluate              CRANIAL NERVES     CN III, IV, VI   Pupils are equal, round, and reactive to light.       Significant Labs: All pertinent labs within the past 24 hours have been reviewed.  Recent Lab Results         03/24/25  1211         "Albumin 2.7              ALT 27       Anion Gap 8       AST 32       Baso # 0.09       Basophil % 0.3       BILIRUBIN TOTAL 3.8  Comment: For infants and newborns, interpretation of results should be based   on gestational age, weight and in agreement with clinical   observations.    Premature Infant recommended reference ranges:   0-24 hours:  <8.0 mg/dL   24-48 hours: <12.0 mg/dL   3-5 days:    <15.0 mg/dL   6-29 days:   <15.0 mg/dL       BUN 23       Calcium 9.2       Chloride 100       CO2 30       Creatinine 0.6       eGFR >60       Eos # 0.04       Eos % 0.2       Glucose 119       Hematocrit 36.6       Hemoglobin 11.8       Immature Grans (Abs) 0.34  Comment: Mild elevation in immature granulocytes is non specific and can be seen in a variety of conditions including stress response, acute inflammation, trauma and pregnancy. Correlation with other laboratory and clinical findings is essential.       Immature Granulocytes 1.3       Lipase <3       Lymph # 2.90       LYMPH % 11.0       MCH 29.4       MCHC 32.2       MCV 91       Mono # 1.50       Mono % 5.7       MPV 10.5       Neut # 21.5       Neut % 81.5       nRBC 0       Platelet Count 480       Potassium 3.8       PROTEIN TOTAL 7.9       RBC 4.01       RDW 16.2       Sodium 138       WBC 26.32               Significant Imaging: I have reviewed all pertinent imaging results/findings within the past 24 hours.    X-Ray Chest 1 View  Narrative: EXAMINATION:  XR CHEST 1 VIEW    CLINICAL HISTORY:  Provided history is "Leukocytosis, r/o pna;  ".    TECHNIQUE:  One view of the chest.    COMPARISON:  11/25/2024.    FINDINGS:  Cardiac silhouette is mildly enlarged.  Atherosclerotic calcifications overlie the aortic arch.  Left chest wall cardiac pacing device is present with transvenous leads overlying the heart.  Coarse interstitial lung markings without large focal area of consolidation.  No large pleural effusion.  No distinct pneumothorax.  Biliary stent " overlying the upper abdomen.  IV contrast in the collecting system in the upper abdomen from recent CT scan.  Impression: Cardiomegaly with mild interstitial edema.  No large focal consolidation identified on this limited single view.    Electronically signed by: Ganesh Pittman MD  Date:    03/24/2025  Time:    18:18  CT Abdomen Pelvis With IV Contrast NO Oral Contrast  Narrative: EXAMINATION:  CT ABDOMEN PELVIS WITH IV CONTRAST    CLINICAL HISTORY:  Abdominal pain, acute, nonlocalized;    TECHNIQUE:  Low dose axial images, sagittal and coronal reformations were obtained from the lung bases to the pubic symphysis following the IV administration of 100 mL of Omnipaque 350 .  Oral contrast was not given.    COMPARISON:  CT abdomen pelvis, 02/27/2025.    FINDINGS:  Lower Chest:    Coarse bibasilar interstitial lung markings.  Heart is mildly enlarged.  Enlargement of the right atrium and right ventricle as seen previously.  Partially imaged cardiac pacing device.  No consolidation or pleural effusion.  No pericardial effusion.  Evaluation of the lower chest limited by motion.    Abdomen:    Evaluation is limited by extensive streak artifact due to the patient's arms overlying the field of view.  Exam quality also limited by motion.    Liver is similar in size and contour.  Similar subcentimeter hypodensities in the liver, too small to definitively characterize.  Intra and extrahepatic biliary duct dilatation, though improved from prior study, with new pneumobilia and new common bile duct stent.  Gallbladder is markedly distended, more distended when compared with recent prior study.  There is probable gallbladder wall thickening with pericholecystic fluid.  New suspected hypoattenuating fluid in the central liver adjacent to the gallbladder (axial image 77).  Trace free fluid adjacent to the gallbladder.  Gallstones suspected within the gallbladder, as well as biliary sludge.    Spleen is not enlarged.  Adrenal glands  are stable and negative for acute finding.  Pancreas is atrophic and suboptimally delineated.  There is probable pancreatic duct dilatation.  There is a probable pancreatic mass in the pancreatic head measuring roughly 1.5 cm in size (axial image 91).  Suggest correlation with recent ERCP and endoscopic ultrasound.    The kidneys are symmetric.  No hydronephrosis. No asymmetric perinephric fat stranding.    No small bowel obstruction.  Mild stool burden in the rectum.  Few colonic diverticula.  No evidence of acute diverticulitis.    No pneumoperitoneum or organized fluid collection.    No bulky retroperitoneal lymphadenopathy.  Subcentimeter peripancreatic lymph nodes.    Abdominal aorta is similar in course and caliber with moderate calcific atherosclerosis.  Similar probable stenosis of the proximal celiac artery, with patent splenic and hepatic arteries.    Portal vein is suboptimally opacified and therefore patency of the portal venous system cannot be confirmed.  Narrowing of the portosplenic confluence better evaluated on prior CT secondary to differences in bolus timing.    Pelvis:    Urinary bladder is decompressed with symmetric wall thickening.  This could be correlated with urinalysis if there are symptoms of urinary tract infection.  Moderate stool burden in the rectum.  Mild presacral edema.  Trace pelvic free fluid.  Uterus is likely absent.    Bones and soft tissues:    No aggressive osseous lesions.  Moderate to advanced degenerative changes in the lower lumbar spine.  Minimal scoliotic curvature of the spine.  Mild right lateral listhesis of L3 on L4.  Mild degenerative changes in both hips.  Mild diffuse body wall edema.  Mild diastasis recti with small fat containing ventral abdominal hernia as seen previously.  Impression: Markedly distended gallbladder with gallbladder wall thickening, pericholecystic edema, and small volume free fluid adjacent to the gallbladder.  Underlying cholecystitis  and/or gallbladder wall perforation included in the differential.  Recommend correlation with symptoms in this patient with provided history of nonlocalized acute abdominal pain, as there is no clinical history available in the chart at the time of dictation.    Interval placement of common bile duct stent with intra and extrahepatic biliary duct dilatation, though improved from prior study.  Dilated pancreatic duct and probable pancreatic head mass.  Recommend correlation with recent ERCP and EUS.  Correlation with tissue sampling also advised.    Moderate stool burden in the rectum with mild presacral edema and trace pelvic free fluid.  Suggest correlation for constipation or stercoral colitis.    Similar subcentimeter hepatic hypodensities.  Suggest continued attention on follow-up in this patient with suspected pancreatic head mass.    Motion and artifact limited study related to suboptimal patient positioning.    Additional findings discussed in the body of the report.    This report was flagged in Epic as abnormal.    Electronically signed by: Ganesh Pittman MD  Date:    03/24/2025  Time:    17:57      Assessment/Plan:     * Acute cholecystitis  NPO  Hold Xarelto start heparin  Consult general surgery  Consult GI  Continue IV Zosyn  Pain control  Attempt to get in touch with family  Consult social work for help with patient care  Consult palliative      Malignant neoplasm of pancreas  Noted likely metastatic  Pain control  Consult palliative      A-fib   Patient has paroxysmal (<7 days) atrial fibrillation. Patient is currently in sinus rhythm. [id number] Score: 4. The patients heart rate in the last 24 hours is as follows:  Pulse  Min: 87  Max: 106     Antiarrhythmics  metoprolol succinate (TOPROL-XL) 24 hr tablet 25 mg, Daily, Oral    Anticoagulants  heparin (porcine) injection 5,000 Units, Every 8 hours, Subcutaneous    Plan  - Replete lytes with a goal of K>4, Mg >2  - Patient is anticoagulated, see  "medications listed above.  - Patient's afib is currently controlled        HTN (hypertension)  Patient's blood pressure range in the last 24 hours was: BP  Min: 94/62  Max: 133/80.The patient's inpatient anti-hypertensive regimen is listed below:  Current Antihypertensives  lisinopriL tablet 20 mg, Daily, Oral  metoprolol succinate (TOPROL-XL) 24 hr tablet 25 mg, Daily, Oral    Plan  - BP is controlled, no changes needed to their regimen    (HFpEF) heart failure with preserved ejection fraction  Patient does not appear to be in any fluid overload      Type 2 diabetes mellitus  Patient's FSGs are controlled on current medication regimen.  Last A1c reviewed-   Lab Results   Component Value Date    HGBA1C 4.7 02/28/2025     Most recent fingerstick glucose reviewed- No results for input(s): "POCTGLUCOSE" in the last 24 hours.  Current correctional scale   controlled with diet and weight loss  Maintain anti-hyperglycemic dose as follows-   Antihyperglycemics (From admission, onward)      None          Hold Oral hypoglycemics while patient is in the hospital.    Dementia  Patient with dementia with likely etiology of vascular dementia. Dementia is severe. The patient does not have signs of behavioral disturbance. Home dementia medications are Held or Continued:  None prescribed . Continue non-pharmacologic interventions to prevent delirium (No VS between 11PM-5AM, activity during day, opening blinds, providing glasses/hearing aids, and up in chair during daytime). Will avoid narcotics and benzos unless absolutely necessary. PRN anti-psychotics are not prescribed to avoid self harm behaviors.      VTE Risk Mitigation (From admission, onward)           Ordered     heparin (porcine) injection 5,000 Units  Every 8 hours         03/24/25 1841     IP VTE HIGH RISK PATIENT  Once         03/24/25 1841     Place sequential compression device  Until discontinued         03/24/25 1841                         On 03/24/2025, patient " should be placed in hospital observation services under my care in collaboration with Seth.           Gely Salazar NP  Department of Hospital Medicine  [facility] - Emergency Dept

## 2025-03-25 NOTE — HOSPITAL COURSE
Patient is seen and examined and closely followed during hospitalization.  General surgery was consulted who recommended IR placement of drain given surgery would be invasive and high-risk for complications in the setting of her comorbidities.  Patient's family wanted to hold off any further procedures.  Palliative Care was consulted who had a meeting with patient family, patient was made to go ahead with hospice to a facility.  Case management was consulted.  Hospice was arranged at [facility].  Discussed with the patient's granddaughter and hospice nurse raegan about the discharge plan.  Palliative also readdressed the family concerns and recommended against PEG tube and patient will continue pleasure feeding.  All instructions sent as facility orders to facility.  Instructions also provided in printed format.

## 2025-03-25 NOTE — ASSESSMENT & PLAN NOTE
Patient's blood pressure range in the last 24 hours was: BP  Min: 97/67  Max: 133/80.The patient's inpatient anti-hypertensive regimen is listed below:  Current Antihypertensives  metoprolol succinate (TOPROL-XL) 24 hr tablet 25 mg, Daily, Oral    Plan  - BP is controlled, no changes needed to their regimen

## 2025-03-25 NOTE — ASSESSMENT & PLAN NOTE
Patient has paroxysmal (<7 days) atrial fibrillation. Patient is currently in sinus rhythm. [id number] Score: 4. The patients heart rate in the last 24 hours is as follows:  Pulse  Min: 79  Max: 112     Antiarrhythmics  metoprolol succinate (TOPROL-XL) 24 hr tablet 25 mg, Daily, Oral    Anticoagulants  heparin (porcine) injection 5,000 Units, Every 8 hours, Subcutaneous    Plan  - Replete lytes with a goal of K>4, Mg >2  - Patient is anticoagulated, see medications listed above.  - Patient's afib is currently controlled  - Xarelto on hold in case she requires any procedure

## 2025-03-25 NOTE — SUBJECTIVE & OBJECTIVE
Interval History:  Patient is seen and examined.  She is confused from dementia at baseline.      Review of Systems  Objective:     Vital Signs (Most Recent):  Temp: 98.3 °F (36.8 °C) (03/25/25 1642)  Pulse: (!) 112 (03/25/25 1642)  Resp: 18 (03/25/25 1642)  BP: 120/82 (03/25/25 1642)  SpO2: 96 % (03/25/25 1642) Vital Signs (24h Range):  Temp:  [97.9 °F (36.6 °C)-98.4 °F (36.9 °C)] 98.3 °F (36.8 °C)  Pulse:  [] 112  Resp:  [16-20] 18  SpO2:  [94 %-99 %] 96 %  BP: ()/(63-82) 120/82     Weight: 46.7 kg (102 lb 14.4 oz)  Body mass index is 18.82 kg/m².    Intake/Output Summary (Last 24 hours) at 3/25/2025 1721  Last data filed at 3/25/2025 0917  Gross per 24 hour   Intake 727.6 ml   Output --   Net 727.6 ml         Physical Exam  Vitals reviewed.   Constitutional:       General: She is awake. She is not in acute distress.     Appearance: Normal appearance. She is not toxic-appearing.   HENT:      Head: Normocephalic and atraumatic.      Mouth/Throat:      Mouth: Mucous membranes are moist.      Pharynx: Oropharynx is clear.   Eyes:      Extraocular Movements: Extraocular movements intact.      Pupils: Pupils are equal, round, and reactive to light.   Cardiovascular:      Rate and Rhythm: Normal rate and regular rhythm.      Pulses: Normal pulses.      Heart sounds: Normal heart sounds.   Pulmonary:      Effort: Pulmonary effort is normal.      Breath sounds: Normal breath sounds.   Abdominal:      General: Bowel sounds are normal. There is no distension.      Palpations: Abdomen is soft.      Tenderness: There is no abdominal tenderness.   Musculoskeletal:         General: No swelling. Normal range of motion.      Cervical back: Normal range of motion and neck supple.   Skin:     General: Skin is warm and dry.      Capillary Refill: Capillary refill takes less than 2 seconds.      Comments: Vitiligo   Neurological:      General: No focal deficit present.      Mental Status: She is alert. Mental status is at  baseline.   Psychiatric:      Comments: Unable to evaluate               Significant Labs: All pertinent labs within the past 24 hours have been reviewed.    Significant Imaging: I have reviewed all pertinent imaging results/findings within the past 24 hours.

## 2025-03-25 NOTE — H&P
GENERAL SURGERY  INPATIENT CONSULT    REASON FOR CONSULT: Diffuse abdominal pain, suspected perforated gallbladder    HPI: Megan Godoy is a 89 y.o. female with dementia, CHF, AFib long-term anticoagulation with the Xarelto, COPD and recent diagnosis of pancreatic adenocarcinoma status post ERCP on 02/28/2025 who presented abdominal pain. Was seen in GI office and referred to the emergency room for these symptoms. Patient does not have family members available and have not been able to be contacted. Found to have a leukocytosis of 26.3. Bilirubin 3.8 and downtrending from previous. CT abdomen pelvis obtained which showed an extremely dilated gallbladder with what I suspect to be contained perforation. Admitted to Hospital Medicine and started on antibiotics.    Seen at bedside this morning.  It is awake in his able to answer simple questions and may but it is still confused. Moans loudly when abdomen is touched.    I have reviewed the patient's chart including prior progress notes, procedures and testing.     ROS:   Review of Systems    PROBLEM LIST:  Problem List[1]      HISTORY  History reviewed. No pertinent past medical history.    Past Surgical History:   Procedure Laterality Date    ENDOSCOPIC ULTRASOUND OF UPPER GASTROINTESTINAL TRACT N/A 2/28/2025    Procedure: ULTRASOUND, UPPER GI TRACT, ENDOSCOPIC;  Surgeon: Goyo Mai MD;  Location: [facility];  Service: Endoscopy;  Laterality: N/A;    ERCP N/A 2/28/2025    Procedure: ERCP (ENDOSCOPIC RETROGRADE CHOLANGIOPANCREATOGRAPHY);  Surgeon: Goyo Mai MD;  Location: [facility];  Service: Endoscopy;  Laterality: N/A;       Social History[2]    No family history on file.      MEDS:  Medications Ordered Prior to Encounter[3]    ALLERGIES:  Review of patient's allergies indicates:  No Known Allergies      VITALS:  Temp:  [97.9 °F (36.6 °C)-98.4 °F (36.9 °C)] 97.9 °F (36.6 °C)  Pulse:  [] 79  Resp:  [15-20] 18  SpO2:  [95 %-100 %] 99 %  BP:  ()/(62-80) 115/74    I/O last 3 completed shifts:  In: 727.6 [I.V.:536.9; IV Piggyback:190.7]  Out: -       PHYSICAL EXAM:  Physical Exam  Vitals reviewed.   Constitutional:       General: She is in acute distress.   Eyes:      General: No scleral icterus.  Cardiovascular:      Rate and Rhythm: Normal rate and regular rhythm.      Pulses: Normal pulses.   Pulmonary:      Effort: Pulmonary effort is normal. No respiratory distress.      Breath sounds: Normal breath sounds.   Abdominal:      Palpations: Abdomen is soft.      Tenderness: There is abdominal tenderness (Rather diffuse but does seem to be worse in the right upper quadrant). There is guarding. There is no rebound.   Musculoskeletal:         General: Tenderness present. No swelling.      Cervical back: Normal range of motion and neck supple. No rigidity.      Right lower leg: No edema.      Left lower leg: No edema.      Comments: Complaining of pain and legs though unable to tell me where or what leg   Neurological:      Mental Status: She is alert. She is disoriented.      Comments: Assumed to be at her baseline           LABS:  Lab Results   Component Value Date    WBC 26.32 (H) 03/24/2025    RBC 4.01 03/24/2025    HGB 11.8 (L) 03/24/2025    HCT 36.6 (L) 03/24/2025     (H) 03/24/2025     Lab Results   Component Value Date     03/02/2025     03/24/2025    K 3.8 03/24/2025     03/02/2025    CO2 30 (H) 03/24/2025    BUN 23 03/24/2025    CREATININE 0.6 03/24/2025    CALCIUM 9.2 03/24/2025     Lab Results   Component Value Date    ALT 27 03/24/2025    AST 32 03/24/2025    ALKPHOS 142 (H) 03/24/2025    BILITOT 3.8 (H) 03/24/2025     Lab Results   Component Value Date    MG 1.8 03/03/2025    PHOS 2.5 (L) 03/03/2025       STUDIES:  Images and reports were personally reviewed.    CT abdomen pelvis  Impression:     Markedly distended gallbladder with gallbladder wall thickening, pericholecystic edema, and small volume free fluid  adjacent to the gallbladder.  Underlying cholecystitis and/or gallbladder wall perforation included in the differential.  Recommend correlation with symptoms in this patient with provided history of nonlocalized acute abdominal pain, as there is no clinical history available in the chart at the time of dictation.     Interval placement of common bile duct stent with intra and extrahepatic biliary duct dilatation, though improved from prior study.  Dilated pancreatic duct and probable pancreatic head mass.  Recommend correlation with recent ERCP and EUS.  Correlation with tissue sampling also advised.     Moderate stool burden in the rectum with mild presacral edema and trace pelvic free fluid.  Suggest correlation for constipation or stercoral colitis.     Similar subcentimeter hepatic hypodensities.  Suggest continued attention on follow-up in this patient with suspected pancreatic head mass.     Motion and artifact limited study related to suboptimal patient positioning.     Additional findings discussed in the body of the report.     This report was flagged in Epic as abnormal      ASSESSMENT & PLAN:  89 y.o. female with diffuse abdominal pain, leukocytosis, suspected perforation of the gallbladder, pancreatic adenocarcinoma, AFib on Xarelto, dementia  -the patient has acute problem appears to be suspected cholecystitis and/or contain gallbladder perforation  -would initially recommend IR placement of drain, she was on Xarelto however benefits likely outweigh the risks given concerns for perforation and significant abdominal pain with leukocytosis  -surgery could be considered however we would be more invasive high-risk for complications and or bleeding  -palliative care consult recommended given the patient's current condition, diagnosis of pancreatic adenocarcinoma, advanced dementia though contacting family had apparently has been an issue in the past  -continue antibiotics and continue to hold  Xarelto                     [1]   Patient Active Problem List  Diagnosis    Biliary obstruction    Dementia    Type 2 diabetes mellitus    (HFpEF) heart failure with preserved ejection fraction    HTN (hypertension)    A-fib    Pancreatic mass    Malignant neoplasm of pancreas    Acute cholecystitis   [2]   Social History  Tobacco Use    Smoking status: Unknown   Substance Use Topics    Alcohol use: Never   [3]   No current facility-administered medications on file prior to encounter.     Current Outpatient Medications on File Prior to Encounter   Medication Sig Dispense Refill    albuterol-ipratropium (DUO-NEB) 2.5 mg-0.5 mg/3 mL nebulizer solution Take 3 mLs by nebulization every 6 (six) hours as needed for Shortness of Breath.      atorvastatin (LIPITOR) 10 MG tablet Take 10 mg by mouth once daily.      benazepriL (LOTENSIN) 20 MG tablet Take 20 mg by mouth once daily.      metoprolol succinate (TOPROL-XL) 25 MG 24 hr tablet Take 25 mg by mouth once daily.      pantoprazole (PROTONIX) 40 MG tablet Take 40 mg by mouth once daily.      pregabalin (LYRICA) 50 MG capsule Take 50 mg by mouth 3 (three) times daily.      sertraline (ZOLOFT) 25 MG tablet Take 25 mg by mouth once daily.      XARELTO 15 mg Tab Take 15 mg by mouth once daily.

## 2025-03-25 NOTE — ASSESSMENT & PLAN NOTE
Patient's blood pressure range in the last 24 hours was: BP  Min: 94/62  Max: 133/80.The patient's inpatient anti-hypertensive regimen is listed below:  Current Antihypertensives  lisinopriL tablet 20 mg, Daily, Oral  metoprolol succinate (TOPROL-XL) 24 hr tablet 25 mg, Daily, Oral    Plan  - BP is controlled, no changes needed to their regimen

## 2025-03-25 NOTE — HPI
From admission HPI: Who is a poor historian she has dementia and does not talk she only moans and groans in response to questions and assessment.  According to the ED patient was sent here by GI for the possibility of hospice with acute cholecystitis.  ED MD spoke with general surgery who will evaluate patient.  Unable to get in contact with patient's family attempted grandson on file. Patient's Xarelto held and started on Heparin.  Patient moans when palpating abdomen.      Triage vitals with pulse 99, /65, temp 98.4°, SpO2 97% on room air.  Blood work with CO2 30, glucose 119, albumin 2.7, total bili 3.8, alk phos 142, WBC 26.3, hemoglobin 11.8, hematocrit 36.6, platelet 480.  Chest x-ray with cardiomegaly with mild interstitial edema.  No large focal consolidation identified on this limited single view.  CT scan abdomen and pelvis with IV contrast markedly distended gallbladder with gallbladder wall thickening pericolic cystitis edema and small volume free fluid adjacent to the gallbladder.  Underlying cholecystitis and/or gallbladder wall perforation included in the differential.  Recommend correlation with symptoms in his patient with provided history of non localized acute abdominal pain, as there is no clinical history available in chart at the time of dictation.  Interval placement of common bile duct stent with intra and extrahepatic biliary duct dilatation.  Dilated pancreatic does not probable pancreatic head mass.  Moderate stool burden in the rectum and mild presacral edema and trace pelvic free fluid.  Similar subcentimeter hepatic hypodensities.  Motion and artifact limited study.    Palliative medicine consult:  Patient currently admitted and being treated for acute cholecystitis.  She was recently diagnosed with adenocarcinoma of pancreas.  We have been consulted for goals of care discussion.

## 2025-03-25 NOTE — ASSESSMENT & PLAN NOTE
Patient has paroxysmal (<7 days) atrial fibrillation. Patient is currently in sinus rhythm. [id number] Score: 4. The patients heart rate in the last 24 hours is as follows:  Pulse  Min: 87  Max: 106     Antiarrhythmics  metoprolol succinate (TOPROL-XL) 24 hr tablet 25 mg, Daily, Oral    Anticoagulants  heparin (porcine) injection 5,000 Units, Every 8 hours, Subcutaneous    Plan  - Replete lytes with a goal of K>4, Mg >2  - Patient is anticoagulated, see medications listed above.  - Patient's afib is currently controlled

## 2025-03-25 NOTE — ASSESSMENT & PLAN NOTE
Family meeting today, patient to go on hospice  IV fluids  Diet restarted but as per the nurse patient is refusing knee food or drinks  Pain control  Antibiotics

## 2025-03-25 NOTE — PROGRESS NOTES
[facility] Medicine  Progress Note    Patient Name: Megan Godoy  MRN: [id number]  Patient Class: IP- Inpatient   Admission Date: 3/24/2025  Length of Stay: 0 days  Attending Physician: Sonu Truong, *  Primary Care Provider: Yamileth, Primary Doctor        Subjective     Principal Problem:Acute cholecystitis        HPI:  Who is a poor historian she has dementia and does not talk she only moans and groans in response to questions and assessment.  According to the ED patient was sent here by GI for the possibility of hospice with acute cholecystitis.  ED MD spoke with general surgery who will evaluate patient.  Unable to get in contact with patient's family attempted grandson on file. Patient's Xarelto held and started on Heparin.  Patient moans when palpating abdomen.     Triage vitals with pulse 99, /65, temp 98.4°, SpO2 97% on room air.  Blood work with CO2 30, glucose 119, albumin 2.7, total bili 3.8, alk phos 142, WBC 26.3, hemoglobin 11.8, hematocrit 36.6, platelet 480.  Chest x-ray with cardiomegaly with mild interstitial edema.  No large focal consolidation identified on this limited single view.  CT scan abdomen and pelvis with IV contrast markedly distended gallbladder with gallbladder wall thickening pericolic cystitis edema and small volume free fluid adjacent to the gallbladder.  Underlying cholecystitis and/or gallbladder wall perforation included in the differential.  Recommend correlation with symptoms in his patient with provided history of non localized acute abdominal pain, as there is no clinical history available in chart at the time of dictation.  Interval placement of common bile duct stent with intra and extrahepatic biliary duct dilatation.  Dilated pancreatic does not probable pancreatic head mass.  Moderate stool burden in the rectum and mild presacral edema and trace pelvic free fluid.  Similar subcentimeter hepatic hypodensities.  Motion and artifact  limited study.    Overview/Hospital Course:  Patient is seen and examined and closely followed during hospitalization.  General surgery was consulted who recommended IR placement of drain given surgery would be invasive and high-risk for complications in the setting of her comorbidities.  Patient's family wanted to hold off any further procedures.  Palliative Care was consulted who had a meeting with patient family, patient was made to go ahead with hospice to a facility.  Case management was consulted.     Interval History:  Patient is seen and examined.  She is confused from dementia at baseline.      Review of Systems  Objective:     Vital Signs (Most Recent):  Temp: 98.3 °F (36.8 °C) (03/25/25 1642)  Pulse: (!) 112 (03/25/25 1642)  Resp: 18 (03/25/25 1642)  BP: 120/82 (03/25/25 1642)  SpO2: 96 % (03/25/25 1642) Vital Signs (24h Range):  Temp:  [97.9 °F (36.6 °C)-98.4 °F (36.9 °C)] 98.3 °F (36.8 °C)  Pulse:  [] 112  Resp:  [16-20] 18  SpO2:  [94 %-99 %] 96 %  BP: ()/(63-82) 120/82     Weight: 46.7 kg (102 lb 14.4 oz)  Body mass index is 18.82 kg/m².    Intake/Output Summary (Last 24 hours) at 3/25/2025 1721  Last data filed at 3/25/2025 0917  Gross per 24 hour   Intake 727.6 ml   Output --   Net 727.6 ml         Physical Exam  Vitals reviewed.   Constitutional:       General: She is awake. She is not in acute distress.     Appearance: Normal appearance. She is not toxic-appearing.   HENT:      Head: Normocephalic and atraumatic.      Mouth/Throat:      Mouth: Mucous membranes are moist.      Pharynx: Oropharynx is clear.   Eyes:      Extraocular Movements: Extraocular movements intact.      Pupils: Pupils are equal, round, and reactive to light.   Cardiovascular:      Rate and Rhythm: Normal rate and regular rhythm.      Pulses: Normal pulses.      Heart sounds: Normal heart sounds.   Pulmonary:      Effort: Pulmonary effort is normal.      Breath sounds: Normal breath sounds.   Abdominal:       "General: Bowel sounds are normal. There is no distension.      Palpations: Abdomen is soft.      Tenderness: There is no abdominal tenderness.   Musculoskeletal:         General: No swelling. Normal range of motion.      Cervical back: Normal range of motion and neck supple.   Skin:     General: Skin is warm and dry.      Capillary Refill: Capillary refill takes less than 2 seconds.      Comments: Vitiligo   Neurological:      General: No focal deficit present.      Mental Status: She is alert. Mental status is at baseline.   Psychiatric:      Comments: Unable to evaluate               Significant Labs: All pertinent labs within the past 24 hours have been reviewed.    Significant Imaging: I have reviewed all pertinent imaging results/findings within the past 24 hours.      Assessment & Plan  Acute cholecystitis  Family meeting today, patient to go on hospice  IV fluids  Diet restarted but as per the nurse patient is refusing knee food or drinks  Pain control  Antibiotics  Dementia  Patient with dementia with likely etiology of vascular dementia. Dementia is severe. The patient does not have signs of behavioral disturbance. Home dementia medications are Held or Continued:  None prescribed . Continue non-pharmacologic interventions to prevent delirium (No VS between 11PM-5AM, activity during day, opening blinds, providing glasses/hearing aids, and up in chair during daytime). Will avoid narcotics and benzos unless absolutely necessary. PRN anti-psychotics are not prescribed to avoid self harm behaviors.  Type 2 diabetes mellitus  Patient's FSGs are controlled on current medication regimen.  Last A1c reviewed-   Lab Results   Component Value Date    HGBA1C 4.7 02/28/2025     Most recent fingerstick glucose reviewed- No results for input(s): "POCTGLUCOSE" in the last 24 hours.  Current correctional scale   controlled with diet and weight loss  Maintain anti-hyperglycemic dose as follows-   Antihyperglycemics (From " admission, onward)      None          Hold Oral hypoglycemics while patient is in the hospital.  (HFpEF) heart failure with preserved ejection fraction  Patient does not appear to be in any fluid overload    HTN (hypertension)  Patient's blood pressure range in the last 24 hours was: BP  Min: 97/67  Max: 133/80.The patient's inpatient anti-hypertensive regimen is listed below:  Current Antihypertensives  metoprolol succinate (TOPROL-XL) 24 hr tablet 25 mg, Daily, Oral    Plan  - BP is controlled, no changes needed to their regimen  A-fib   Patient has paroxysmal (<7 days) atrial fibrillation. Patient is currently in sinus rhythm. [id number] Score: 4. The patients heart rate in the last 24 hours is as follows:  Pulse  Min: 79  Max: 112     Antiarrhythmics  metoprolol succinate (TOPROL-XL) 24 hr tablet 25 mg, Daily, Oral    Anticoagulants  heparin (porcine) injection 5,000 Units, Every 8 hours, Subcutaneous    Plan  - Replete lytes with a goal of K>4, Mg >2  - Patient is anticoagulated, see medications listed above.  - Patient's afib is currently controlled  - Xarelto on hold in case she requires any procedure  Malignant neoplasm of pancreas  Noted likely metastatic  Pain control  Palliative consulted    ACP (advance care planning)  See palliative care note, appreciate recommendations    VTE Risk Mitigation (From admission, onward)           Ordered     heparin (porcine) injection 5,000 Units  Every 8 hours         03/24/25 1841     IP VTE HIGH RISK PATIENT  Once         03/24/25 1841     Place sequential compression device  Until discontinued         03/24/25 1841                    Discharge Planning   MAGY: 3/26/2025     Code Status: DNR   Medical Readiness for Discharge Date:   Discharge Plan A: Hospice/home, Return to nursing home   Discharge Delays: (!) Post-Acute Set-up                    Sonu Truong MD  Department of Hospital Medicine   [facility]

## 2025-03-25 NOTE — ASSESSMENT & PLAN NOTE
Advance Care Planning     Date: 03/25/2025    Code Status  In light of the patients advanced and life limiting illness,I engaged the the family in a voluntary conversation about the patient's preferences for care  at the very end of life. The patient wishes to have a natural, peaceful death.  Along those lines, the patient does not wish to have CPR or other invasive treatments performed when her heart and/or breathing stops. I communicated to the family that a DNR order would be placed in her medical record to reflect this preference.    [facility]  I engaged the family in a voluntary conversation about advance care planning and we specifically addressed what the goals of care would be moving forward, in light of the patient's change in clinical status, specifically pancreatic adenocarcinoma, cholecystitis, advance dementia, debilitated state.  We did specifically address the patient's likely prognosis, which is poor.  We explored the patient's values and preferences for future care.  The family endorses that what is most important right now is to focus on spending time at home, symptom/pain control, and comfort and QOL     Accordingly, we have decided that the best plan to meet the patient's goals includes enrolling in hospice care    Hospice  I did explain the role for hospice care at this stage of the patient's illness, including its ability to help the patient live with the best quality of life possible.  We will be making a hospice referral.

## 2025-03-25 NOTE — HPI
Who is a poor historian she has dementia and does not talk she only moans and groans in response to questions and assessment.  According to the ED patient was sent here by GI for the possibility of hospice with acute cholecystitis.  ED MD spoke with general surgery who will evaluate patient.  Unable to get in contact with patient's family attempted grandson on file. Patient's Xarelto held and started on Heparin.  Patient moans when palpating abdomen.     Triage vitals with pulse 99, /65, temp 98.4°, SpO2 97% on room air.  Blood work with CO2 30, glucose 119, albumin 2.7, total bili 3.8, alk phos 142, WBC 26.3, hemoglobin 11.8, hematocrit 36.6, platelet 480.  Chest x-ray with cardiomegaly with mild interstitial edema.  No large focal consolidation identified on this limited single view.  CT scan abdomen and pelvis with IV contrast markedly distended gallbladder with gallbladder wall thickening pericolic cystitis edema and small volume free fluid adjacent to the gallbladder.  Underlying cholecystitis and/or gallbladder wall perforation included in the differential.  Recommend correlation with symptoms in his patient with provided history of non localized acute abdominal pain, as there is no clinical history available in chart at the time of dictation.  Interval placement of common bile duct stent with intra and extrahepatic biliary duct dilatation.  Dilated pancreatic does not probable pancreatic head mass.  Moderate stool burden in the rectum and mild presacral edema and trace pelvic free fluid.  Similar subcentimeter hepatic hypodensities.  Motion and artifact limited study.

## 2025-03-25 NOTE — ASSESSMENT & PLAN NOTE
Patient with dementia with likely etiology of vascular dementia. Dementia is severe. The patient does not have signs of behavioral disturbance. Home dementia medications are Held or Continued:  None prescribed . Continue non-pharmacologic interventions to prevent delirium (No VS between 11PM-5AM, activity during day, opening blinds, providing glasses/hearing aids, and up in chair during daytime). Will avoid narcotics and benzos unless absolutely necessary. PRN anti-psychotics are not prescribed to avoid self harm behaviors.

## 2025-03-25 NOTE — PLAN OF CARE
Pending sale to [facility]  Initial Discharge Assessment       Primary Care Provider: No, Primary Doctor    Admission Diagnosis: Malignant neoplasm of pancreas, unspecified location of malignancy [C25.9]    Admission Date: 3/24/2025  Expected Discharge Date:       reviewed chart to initiate dc assessment.  Pt confused, has dementia, and is a resident of [facility]. Demographics and insurance verified. BRENDA called [facility] to complete dc assessment; line directed to facility's 's voicemail - BRENDA left message requesting call back.    Payor: [employer] MANAGED MEDICARE / Plan: [employer] HMO PPO SPECIAL NEEDS / Product Type: Medicare Advantage /     Extended Emergency Contact Information  Primary Emergency Contact: WaltYoung  Mobile Phone: [phone]  Relation: Grandchild  Preferred language: English              [place], [place] - [address]  [address]  [address]  [facility] [place] [facility] [address]  Phone: [phone] Fax: [phone]      Initial Assessment (most recent)       Adult Discharge Assessment - 03/25/25 1144          Discharge Assessment    Assessment Type Discharge Planning Assessment     Confirmed/corrected address, phone number and insurance Yes     Confirmed Demographics Correct on Facesheet     Source of Information health record     Reason For Admission Acute cholecystitis     People in Home facility resident     Facility Arrived From: [facility]     Do you expect to return to your current living situation? Yes     Do you have help at home or someone to help you manage your care at home? Yes     Prior to hospitilization cognitive status: Unable to Assess     Current cognitive status: Unable to Assess

## 2025-03-26 VITALS
BODY MASS INDEX: 18.93 KG/M2 | DIASTOLIC BLOOD PRESSURE: 72 MMHG | TEMPERATURE: 98 F | OXYGEN SATURATION: 96 % | WEIGHT: 102.88 LBS | SYSTOLIC BLOOD PRESSURE: 112 MMHG | RESPIRATION RATE: 18 BRPM | HEART RATE: 93 BPM | HEIGHT: 62 IN

## 2025-03-26 LAB
ABSOLUTE EOSINOPHIL (SMH): 0.14 K/UL
ABSOLUTE MONOCYTE (SMH): 1.06 K/UL (ref 0.3–1)
ABSOLUTE NEUTROPHIL COUNT (SMH): 7.5 K/UL (ref 1.8–7.7)
ALBUMIN SERPL-MCNC: 2.3 G/DL (ref 3.5–5.2)
ALP SERPL-CCNC: 99 UNIT/L (ref 55–135)
ALT SERPL-CCNC: 17 UNIT/L (ref 10–44)
ANION GAP (SMH): 7 MMOL/L (ref 8–16)
AST SERPL-CCNC: 18 UNIT/L (ref 10–40)
BASOPHILS # BLD AUTO: 0.04 K/UL
BASOPHILS NFR BLD AUTO: 0.4 %
BILIRUB SERPL-MCNC: 2.7 MG/DL (ref 0.1–1)
BUN SERPL-MCNC: 16 MG/DL (ref 8–23)
CALCIUM SERPL-MCNC: 8.1 MG/DL (ref 8.7–10.5)
CHLORIDE SERPL-SCNC: 109 MMOL/L (ref 95–110)
CO2 SERPL-SCNC: 25 MMOL/L (ref 23–29)
CREAT SERPL-MCNC: 0.5 MG/DL (ref 0.5–1.4)
ERYTHROCYTE [DISTWIDTH] IN BLOOD BY AUTOMATED COUNT: 16.4 % (ref 11.5–14.5)
GFR SERPLBLD CREATININE-BSD FMLA CKD-EPI: >60 ML/MIN/1.73/M2
GLUCOSE SERPL-MCNC: 91 MG/DL (ref 70–110)
HCT VFR BLD AUTO: 30.4 % (ref 37–48.5)
HGB BLD-MCNC: 9.7 GM/DL (ref 12–16)
IMM GRANULOCYTES # BLD AUTO: 0.09 K/UL (ref 0–0.04)
IMM GRANULOCYTES NFR BLD AUTO: 0.8 % (ref 0–0.5)
LYMPHOCYTES # BLD AUTO: 2.23 K/UL (ref 1–4.8)
MAGNESIUM SERPL-MCNC: 1.6 MG/DL (ref 1.6–2.6)
MCH RBC QN AUTO: 29.1 PG (ref 27–31)
MCHC RBC AUTO-ENTMCNC: 31.9 G/DL (ref 32–36)
MCV RBC AUTO: 91 FL (ref 82–98)
NUCLEATED RBC (/100WBC) (SMH): 0 /100 WBC
PLATELET # BLD AUTO: 450 K/UL (ref 150–450)
PMV BLD AUTO: 10 FL (ref 9.2–12.9)
POTASSIUM SERPL-SCNC: 3.2 MMOL/L (ref 3.5–5.1)
PROT SERPL-MCNC: 6.3 GM/DL (ref 6–8.4)
RBC # BLD AUTO: 3.33 M/UL (ref 4–5.4)
RELATIVE EOSINOPHIL (SMH): 1.3 % (ref 0–8)
RELATIVE LYMPHOCYTE (SMH): 20.1 % (ref 18–48)
RELATIVE MONOCYTE (SMH): 9.6 % (ref 4–15)
RELATIVE NEUTROPHIL (SMH): 67.8 % (ref 38–73)
SODIUM SERPL-SCNC: 141 MMOL/L (ref 136–145)
WBC # BLD AUTO: 11.07 K/UL (ref 3.9–12.7)

## 2025-03-26 PROCEDURE — 83735 ASSAY OF MAGNESIUM: CPT | Performed by: NURSE PRACTITIONER

## 2025-03-26 PROCEDURE — 82947 ASSAY GLUCOSE BLOOD QUANT: CPT | Performed by: NURSE PRACTITIONER

## 2025-03-26 PROCEDURE — 36415 COLL VENOUS BLD VENIPUNCTURE: CPT | Performed by: NURSE PRACTITIONER

## 2025-03-26 PROCEDURE — 25000003 PHARM REV CODE 250: Performed by: NURSE PRACTITIONER

## 2025-03-26 PROCEDURE — 99900035 HC TECH TIME PER 15 MIN (STAT)

## 2025-03-26 PROCEDURE — 63600175 PHARM REV CODE 636 W HCPCS: Performed by: NURSE PRACTITIONER

## 2025-03-26 PROCEDURE — 25000003 PHARM REV CODE 250: Performed by: INTERNAL MEDICINE

## 2025-03-26 PROCEDURE — 94761 N-INVAS EAR/PLS OXIMETRY MLT: CPT

## 2025-03-26 PROCEDURE — 85025 COMPLETE CBC W/AUTO DIFF WBC: CPT | Performed by: NURSE PRACTITIONER

## 2025-03-26 RX ORDER — CIPROFLOXACIN 500 MG/1
500 TABLET ORAL 2 TIMES DAILY
Qty: 16 TABLET | Refills: 0 | Status: SHIPPED | OUTPATIENT
Start: 2025-03-26 | End: 2025-03-26

## 2025-03-26 RX ORDER — METRONIDAZOLE 500 MG/1
500 TABLET ORAL 3 TIMES DAILY
Qty: 3 TABLET | Refills: 0 | Status: SHIPPED | OUTPATIENT
Start: 2025-03-26 | End: 2025-03-26

## 2025-03-26 RX ADMIN — PIPERACILLIN AND TAZOBACTAM 4.5 G: 4; .5 INJECTION, POWDER, LYOPHILIZED, FOR SOLUTION INTRAVENOUS at 12:03

## 2025-03-26 RX ADMIN — SERTRALINE HYDROCHLORIDE 25 MG: 25 TABLET ORAL at 10:03

## 2025-03-26 RX ADMIN — METOPROLOL SUCCINATE 25 MG: 25 TABLET, EXTENDED RELEASE ORAL at 10:03

## 2025-03-26 RX ADMIN — HEPARIN SODIUM 5000 UNITS: 5000 INJECTION INTRAVENOUS; SUBCUTANEOUS at 05:03

## 2025-03-26 RX ADMIN — SODIUM CHLORIDE: 9 INJECTION, SOLUTION INTRAVENOUS at 03:03

## 2025-03-26 RX ADMIN — PIPERACILLIN AND TAZOBACTAM 4.5 G: 4; .5 INJECTION, POWDER, LYOPHILIZED, FOR SOLUTION INTRAVENOUS at 05:03

## 2025-03-26 NOTE — PROGRESS NOTES
Pt to DC back to [facility] with [facility]. PC Team will sign off. Thank you for consult. We hope we have been helpful.

## 2025-03-26 NOTE — PLAN OF CARE
Patient discharging back halfway to [facility] with [facility].    ADT placed.    BRENDA gave report into to BRENNA Jimenez via secure chat, report can be called to [phone] [place] [address]        03/26/25 9618   Final Note   Assessment Type Final Discharge Note   Anticipated Discharge Disposition [facility]   Post-Acute Status   Post-Acute Authorization Hospice   Hospice Status Set-up Complete/Auth obtained   Discharge Delays None known at this time

## 2025-03-26 NOTE — DISCHARGE INSTRUCTIONS
Discharge Instructions, [facility]    Thank you for choosing [facility] for your medical care. The primary doctor who is taking care of you at the time of your discharge is Sonu Truong, *.     You were admitted to the hospital with Acute cholecystitis.     Please note your discharge instructions, including diet/activity restrictions, follow-up appointments, and medication changes.  If you have any questions about your medical issues, prescriptions, or any other questions, please feel free to contact the [facility] Medicine Dept at [phone] and we will help.    If you are previously with Home health, outpatient PT/OT or under a therapy program, you are cleared to return to those programs.    Please direct all long term medication refills and follow up to your primary care provider, No, Primary Doctor. Thank you again for letting us take care of your health care needs.    Please note the following discharge instructions per your discharging physician-    Please reach out to hospice team for any help    [facility]  Facility Transfer Orders        Admit to: Facility    Diagnoses:   Active Hospital Problems    Diagnosis  POA    *Acute cholecystitis [K81.0]  Yes    ACP (advance care planning) [Z71.89]  Not Applicable    Goals of care, counseling/discussion [Z71.89]  Not Applicable    Malignant neoplasm of pancreas [C25.9]  Yes    Dementia [F03.90]  Yes    Type 2 diabetes mellitus [E11.9]  Yes    (HFpEF) heart failure with preserved ejection fraction [I50.30]  Yes    HTN (hypertension) [I10]  Yes    A-fib [I48.91]  Yes      Resolved Hospital Problems   No resolved problems to display.     Allergies: Review of patient's allergies indicates:  No Known Allergies    Code Status: DNR/Hospice    Vitals: Routine       Diet: As tolerated    Activity: Activity as tolerated    Nursing Precautions: Aspiration , Fall, and Pressure ulcer  prevention        Oxygen: room air    Dialysis: Patient is not on dialysis.       Pending Diagnostic Studies:       None          Comfort/Hospice care     Medications: Discontinue all previous medication orders, if any. See new list below.  Current Discharge Medication List                                  CONTINUE these medications which have NOT CHANGED    Details   albuterol-ipratropium (DUO-NEB) 2.5 mg-0.5 mg/3 mL nebulizer solution Take 3 mLs by nebulization every 6 (six) hours as needed for Shortness of Breath.      atorvastatin (LIPITOR) 10 MG tablet Take 10 mg by mouth once daily.      metoprolol succinate (TOPROL-XL) 25 MG 24 hr tablet Take 25 mg by mouth once daily.      pantoprazole (PROTONIX) 40 MG tablet Take 40 mg by mouth once daily.      pregabalin (LYRICA) 50 MG capsule Take 50 mg by mouth 3 (three) times daily.      sertraline (ZOLOFT) 25 MG tablet Take 25 mg by mouth once daily.      XARELTO 15 mg Tab Take 15 mg by mouth once daily.           STOP taking these medications       benazepriL (LOTENSIN) 20 MG tablet Comments:   Reason for Stopping:             Follow up:    Contact information for after-discharge care       Home Medical Care       [facility] .    Service: Home Hospice  Contact information:  [address]  [place] [address]  [phone]                                     Immunizations Administered as of 3/26/2025       No immunizations on file.            Sonu Truong MD

## 2025-03-26 NOTE — PLAN OF CARE
Per Yany with [facility]- pt accepted, consents sent to daughter for signing, equipment to be delivered today before dc   03/26/25 0865   Post-Acute Status   Post-Acute Authorization Hospice   Hospice Status Pending equipment/medication delivery   Discharge Delays (!) Post-Acute Set-up   Discharge Plan   Discharge Plan A Hospice/home   Discharge Plan B Hospice/home

## 2025-03-26 NOTE — PLAN OF CARE
ADT30 requested for transport to [facility] for 9530    1613 ETA for ambulance transfer 1700   03/26/25 1539   Post-Acute Status   Post-Acute Authorization Hospice

## 2025-03-26 NOTE — NURSING
1706- Attempted to call report to facility, no answer. SW notified [facility] here to transport pt back to [facility].     1753- attempted to call report again to facility, no answer.

## 2025-03-26 NOTE — CARE UPDATE
03/25/25 1904   Patient Assessment/Suction   Level of Consciousness (AVPU) alert   Respiratory Effort Normal;Unlabored   Expansion/Accessory Muscles/Retractions retractions minimal;expansion symmetric;no use of accessory muscles;no retractions   LISSA Breath Sounds clear   LLL Breath Sounds clear   RUL Breath Sounds clear   RML Breath Sounds clear   RLL Breath Sounds clear   Rhythm/Pattern, Respiratory unlabored;pattern regular;no shortness of breath reported   Cough Frequency no cough   PRE-TX-O2   Device (Oxygen Therapy) room air   SpO2 95 %   Pulse Oximetry Type Intermittent   $ Pulse Oximetry - Multiple Charge Pulse Oximetry - Multiple   Aerosol Therapy   $ Aerosol Therapy Charges PRN treatment not required

## 2025-03-26 NOTE — PLAN OF CARE
Per Y[facility] with [facility], equipment delivered and [facility]     03/26/25 1556   Post-Acute Status   Post-Acute Authorization Hospice   Hospice Status Set-up Complete/Auth obtained

## 2025-03-26 NOTE — PLAN OF CARE
Problem: Adult Inpatient Plan of Care  Goal: Plan of Care Review  Outcome: Met  Goal: Patient-Specific Goal (Individualized)  Outcome: Met  Goal: Absence of Hospital-Acquired Illness or Injury  Outcome: Met  Goal: Optimal Comfort and Wellbeing  Outcome: Met  Goal: Readiness for Transition of Care  Outcome: Met     Problem: Coping Ineffective  Goal: Effective Coping  Outcome: Met     Problem: Diabetes Comorbidity  Goal: Blood Glucose Level Within Targeted Range  Outcome: Met     Problem: Wound  Goal: Optimal Coping  Outcome: Met  Goal: Optimal Functional Ability  Outcome: Met  Goal: Absence of Infection Signs and Symptoms  Outcome: Met  Goal: Improved Oral Intake  Outcome: Met  Goal: Optimal Pain Control and Function  Outcome: Met  Goal: Skin Health and Integrity  Outcome: Met  Goal: Optimal Wound Healing  Outcome: Met     Problem: Fall Injury Risk  Goal: Absence of Fall and Fall-Related Injury  Outcome: Met     Problem: Skin Injury Risk Increased  Goal: Skin Health and Integrity  Outcome: Met

## 2025-03-27 LAB — BACTERIA UR CULT: ABNORMAL

## 2025-03-27 NOTE — DISCHARGE SUMMARY
[facility] Medicine  Discharge Summary      Patient Name: Megan Godoy  MRN: [id number]  [facility]: [id number]  Patient Class: IP- Inpatient  Admission Date: 3/24/2025  Hospital Length of Stay: 1 days  Discharge Date and Time: 3/26/2025  5:37 PM  Attending Physician: Yamileth att. providers found   Discharging Provider: Sonu Truong MD  Primary Care Provider: Yamileth, Primary Doctor    Primary Care Team: Networked reference to record PCT     HPI:   Who is a poor historian she has dementia and does not talk she only moans and groans in response to questions and assessment.  According to the ED patient was sent here by GI for the possibility of hospice with acute cholecystitis.  ED MD spoke with general surgery who will evaluate patient.  Unable to get in contact with patient's family attempted grandson on file. Patient's Xarelto held and started on Heparin.  Patient moans when palpating abdomen.     Triage vitals with pulse 99, /65, temp 98.4°, SpO2 97% on room air.  Blood work with CO2 30, glucose 119, albumin 2.7, total bili 3.8, alk phos 142, WBC 26.3, hemoglobin 11.8, hematocrit 36.6, platelet 480.  Chest x-ray with cardiomegaly with mild interstitial edema.  No large focal consolidation identified on this limited single view.  CT scan abdomen and pelvis with IV contrast markedly distended gallbladder with gallbladder wall thickening pericolic cystitis edema and small volume free fluid adjacent to the gallbladder.  Underlying cholecystitis and/or gallbladder wall perforation included in the differential.  Recommend correlation with symptoms in his patient with provided history of non localized acute abdominal pain, as there is no clinical history available in chart at the time of dictation.  Interval placement of common bile duct stent with intra and extrahepatic biliary duct dilatation.  Dilated pancreatic does not probable pancreatic head mass.  Moderate stool burden in the rectum  and mild presacral edema and trace pelvic free fluid.  Similar subcentimeter hepatic hypodensities.  Motion and artifact limited study.    * No surgery found *      Hospital Course:   Patient is seen and examined and closely followed during hospitalization.  General surgery was consulted who recommended IR placement of drain given surgery would be invasive and high-risk for complications in the setting of her comorbidities.  Patient's family wanted to hold off any further procedures.  Palliative Care was consulted who had a meeting with patient family, patient was made to go ahead with hospice to a facility.  Case management was consulted.  Hospice was arranged at [facility].  Discussed with the patient's granddaughter and hospice nurse raegan about the discharge plan.  Palliative also readdressed the family concerns and recommended against PEG tube and patient will continue pleasure feeding.  All instructions sent as facility orders to facility.  Instructions also provided in printed format.     Goals of Care Treatment Preferences:  Code Status: DNR          What is most important right now is to focus on spending time at home, symptom/pain control, comfort and QOL .  Accordingly, we have decided that the best plan to meet the patient's goals includes enrolling in hospice care.         Consults:   Consults (From admission, onward)          Status Ordering Provider     Inpatient consult to Social Work/Case Management  Once        Provider:  (Not yet assigned)    Completed LILIAN BENZ     Inpatient consult to Social Work  Once        Provider:  (Not yet assigned)    Completed CLARISA NEVAREZ     Inpatient consult to Palliative Care  Once        Provider:  Kranthi Paul MD    Completed CLARISA NEVAREZ     Inpatient consult to General Surgery  Once        Provider:  Anson Priest Jr., MD    Completed CLARISA NEVAREZ            Assessment & Plan    Final Active Diagnoses:     Diagnosis Date Noted POA    PRINCIPAL PROBLEM:  Acute cholecystitis [K81.0] 03/24/2025 Yes    ACP (advance care planning) [Z71.89] 03/25/2025 Not Applicable    Goals of care, counseling/discussion [Z71.89] 03/25/2025 Not Applicable    Malignant neoplasm of pancreas [C25.9] 03/11/2025 Yes    Dementia [F03.90] 02/28/2025 Yes    Type 2 diabetes mellitus [E11.9] 02/28/2025 Yes    (HFpEF) heart failure with preserved ejection fraction [I50.30] 02/28/2025 Yes    HTN (hypertension) [I10] 02/28/2025 Yes    A-fib [I48.91] 02/28/2025 Yes      Problems Resolved During this Admission:       Discharged Condition: stable    Disposition:  Hospice facility    Follow Up:   Contact information for after-discharge care       Destination       [facility] .    Service: Nursing Home  Contact information:  [address] [id number] [phone]                     Home Medical Care       [facility] .    Service: Home Hospice  Contact information:  [address]  [facility] [address]  [phone]                                 Patient Instructions:      Activity as tolerated       Significant Diagnostic Studies: N/A    Pending Diagnostic Studies:       None           Medications:  Reconciled Home Medications:      Medication List        CONTINUE taking these medications      albuterol-ipratropium 2.5 mg-0.5 mg/3 mL nebulizer solution  Commonly known as: DUO-NEB  Take 3 mLs by nebulization every 6 (six) hours as needed for Shortness of Breath.     atorvastatin 10 MG tablet  Commonly known as: LIPITOR  Take 10 mg by mouth once daily.     metoprolol succinate 25 MG 24 hr tablet  Commonly known as: TOPROL-XL  Take 25 mg by mouth once daily.     pantoprazole 40 MG tablet  Commonly known as: PROTONIX  Take 40 mg by mouth once daily.     pregabalin 50 MG capsule  Commonly known as: LYRICA  Take 50 mg by mouth 3 (three) times daily.     sertraline 25 MG tablet  Commonly known as:  ZOLOFT  Take 25 mg by mouth once daily.     XARELTO 15 mg Tab  Generic drug: rivaroxaban  Take 15 mg by mouth once daily.            STOP taking these medications      benazepriL 20 MG tablet  Commonly known as: LOTENSIN              Indwelling Lines/Drains at time of discharge:   Lines/Drains/Airways       None                   Time spent on the discharge of patient: 40 minutes         Sonu Truong MD  Department of Hospital Medicine  [facility]1983

## 2025-04-08 ENCOUNTER — TELEPHONE (OUTPATIENT)
Dept: HEMATOLOGY/ONCOLOGY | Facility: CLINIC | Age: OVER 89
End: 2025-04-08
Payer: MEDICARE

## 2025-04-08 NOTE — TELEPHONE ENCOUNTER
Myself and Reta Arevalo RN, spoke with Sean Che,  with the [employer] to answer questions regarding complaint against [place].     Normal